# Patient Record
Sex: FEMALE | Race: WHITE | Employment: UNEMPLOYED | URBAN - METROPOLITAN AREA
[De-identification: names, ages, dates, MRNs, and addresses within clinical notes are randomized per-mention and may not be internally consistent; named-entity substitution may affect disease eponyms.]

---

## 2020-01-01 ENCOUNTER — APPOINTMENT (OUTPATIENT)
Dept: GENERAL RADIOLOGY | Age: 85
DRG: 480 | End: 2020-01-01
Payer: MEDICARE

## 2020-01-01 ENCOUNTER — APPOINTMENT (OUTPATIENT)
Dept: CT IMAGING | Age: 85
End: 2020-01-01
Payer: MEDICARE

## 2020-01-01 ENCOUNTER — APPOINTMENT (OUTPATIENT)
Dept: CT IMAGING | Age: 85
DRG: 480 | End: 2020-01-01
Payer: MEDICARE

## 2020-01-01 ENCOUNTER — HOSPITAL ENCOUNTER (INPATIENT)
Age: 85
LOS: 2 days | DRG: 480 | End: 2020-09-21
Attending: INTERNAL MEDICINE | Admitting: INTERNAL MEDICINE
Payer: MEDICARE

## 2020-01-01 ENCOUNTER — ANESTHESIA (OUTPATIENT)
Dept: OPERATING ROOM | Age: 85
DRG: 480 | End: 2020-01-01
Payer: MEDICARE

## 2020-01-01 ENCOUNTER — HOSPITAL ENCOUNTER (EMERGENCY)
Age: 85
Discharge: HOME | End: 2020-08-31
Payer: MEDICARE

## 2020-01-01 ENCOUNTER — APPOINTMENT (OUTPATIENT)
Dept: ULTRASOUND IMAGING | Age: 85
DRG: 480 | End: 2020-01-01
Payer: MEDICARE

## 2020-01-01 ENCOUNTER — ANESTHESIA EVENT (OUTPATIENT)
Dept: OPERATING ROOM | Age: 85
DRG: 480 | End: 2020-01-01
Payer: MEDICARE

## 2020-01-01 ENCOUNTER — APPOINTMENT (OUTPATIENT)
Dept: MRI IMAGING | Age: 85
DRG: 480 | End: 2020-01-01
Payer: MEDICARE

## 2020-01-01 VITALS
SYSTOLIC BLOOD PRESSURE: 105 MMHG | RESPIRATION RATE: 19 BRPM | TEMPERATURE: 96.8 F | OXYGEN SATURATION: 78 % | DIASTOLIC BLOOD PRESSURE: 53 MMHG

## 2020-01-01 VITALS
WEIGHT: 115 LBS | OXYGEN SATURATION: 95 % | HEART RATE: 66 BPM | SYSTOLIC BLOOD PRESSURE: 161 MMHG | TEMPERATURE: 98 F | BODY MASS INDEX: 22.58 KG/M2 | DIASTOLIC BLOOD PRESSURE: 91 MMHG | HEIGHT: 60 IN | RESPIRATION RATE: 16 BRPM

## 2020-01-01 VITALS
WEIGHT: 115 LBS | HEART RATE: 22 BPM | DIASTOLIC BLOOD PRESSURE: 14 MMHG | TEMPERATURE: 97.4 F | OXYGEN SATURATION: 71 % | RESPIRATION RATE: 24 BRPM | HEIGHT: 62 IN | SYSTOLIC BLOOD PRESSURE: 111 MMHG | BODY MASS INDEX: 21.16 KG/M2

## 2020-01-01 LAB
ABO/RH: NORMAL
ALBUMIN SERPL-MCNC: 3.2 G/DL (ref 3.5–4.6)
ALBUMIN SERPL-MCNC: 3.3 G/DL (ref 3.5–4.6)
ALBUMIN SERPL-MCNC: 3.6 G/DL (ref 3.5–4.6)
ALBUMIN SERPL-MCNC: 3.9 G/DL (ref 3.5–4.6)
ALBUMIN SERPL-MCNC: 4.5 G/DL (ref 3.5–4.6)
ALP BLD-CCNC: 77 U/L (ref 40–130)
ALP BLD-CCNC: 78 U/L (ref 40–130)
ALP BLD-CCNC: 92 U/L (ref 40–130)
ALT SERPL-CCNC: 13 U/L (ref 0–33)
ALT SERPL-CCNC: 15 U/L (ref 0–33)
ALT SERPL-CCNC: 15 U/L (ref 0–33)
ANION GAP SERPL CALCULATED.3IONS-SCNC: 12 MEQ/L (ref 9–15)
ANION GAP SERPL CALCULATED.3IONS-SCNC: 13 MEQ/L (ref 9–15)
ANION GAP SERPL CALCULATED.3IONS-SCNC: 14 MEQ/L (ref 9–15)
ANION GAP SERPL CALCULATED.3IONS-SCNC: 14 MEQ/L (ref 9–15)
ANION GAP SERPL CALCULATED.3IONS-SCNC: 15 MEQ/L (ref 9–15)
ANTIBODY SCREEN: NORMAL
APTT: 33.3 SEC (ref 24.4–36.8)
APTT: 33.6 SEC (ref 24.4–36.8)
AST SERPL-CCNC: 20 U/L (ref 0–35)
AST SERPL-CCNC: 21 U/L (ref 0–35)
AST SERPL-CCNC: 23 U/L (ref 0–35)
BASE EXCESS ARTERIAL: -6 (ref -3–3)
BASOPHILS ABSOLUTE: 0 K/UL (ref 0–0.2)
BASOPHILS ABSOLUTE: 0.1 K/UL (ref 0–0.2)
BASOPHILS RELATIVE PERCENT: 0.2 %
BASOPHILS RELATIVE PERCENT: 0.7 %
BASOPHILS RELATIVE PERCENT: 0.8 %
BILIRUB SERPL-MCNC: 0.6 MG/DL (ref 0.2–0.7)
BILIRUB SERPL-MCNC: 0.7 MG/DL (ref 0.2–0.7)
BILIRUB SERPL-MCNC: 0.7 MG/DL (ref 0.2–0.7)
BUN BLDV-MCNC: 17 MG/DL (ref 8–23)
BUN BLDV-MCNC: 20 MG/DL (ref 8–23)
BUN BLDV-MCNC: 22 MG/DL (ref 8–23)
BUN BLDV-MCNC: 28 MG/DL (ref 8–23)
BUN BLDV-MCNC: 29 MG/DL (ref 8–23)
C-REACTIVE PROTEIN, HIGH SENSITIVITY: 30 MG/L (ref 0–5)
C-REACTIVE PROTEIN, HIGH SENSITIVITY: 35.4 MG/L (ref 0–5)
CALCIUM IONIZED: 1.22 MMOL/L (ref 1.12–1.32)
CALCIUM SERPL-MCNC: 10.2 MG/DL (ref 8.5–9.9)
CALCIUM SERPL-MCNC: 9 MG/DL (ref 8.5–9.9)
CALCIUM SERPL-MCNC: 9.1 MG/DL (ref 8.5–9.9)
CALCIUM SERPL-MCNC: 9.2 MG/DL (ref 8.5–9.9)
CALCIUM SERPL-MCNC: 9.6 MG/DL (ref 8.5–9.9)
CHLORIDE BLD-SCNC: 102 MEQ/L (ref 95–107)
CHLORIDE BLD-SCNC: 104 MEQ/L (ref 95–107)
CHLORIDE BLD-SCNC: 105 MEQ/L (ref 95–107)
CHLORIDE BLD-SCNC: 106 MEQ/L (ref 95–107)
CHLORIDE BLD-SCNC: 109 MEQ/L (ref 95–107)
CO2: 14 MEQ/L (ref 20–31)
CO2: 16 MEQ/L (ref 20–31)
CO2: 19 MEQ/L (ref 20–31)
CREAT SERPL-MCNC: 0.69 MG/DL (ref 0.5–0.9)
CREAT SERPL-MCNC: 0.72 MG/DL (ref 0.5–0.9)
CREAT SERPL-MCNC: 0.89 MG/DL (ref 0.5–0.9)
CREAT SERPL-MCNC: 0.92 MG/DL (ref 0.5–0.9)
CREAT SERPL-MCNC: 0.97 MG/DL (ref 0.5–0.9)
EKG ATRIAL RATE: 68 BPM
EKG ATRIAL RATE: 70 BPM
EKG P AXIS: 54 DEGREES
EKG P AXIS: 70 DEGREES
EKG P-R INTERVAL: 106 MS
EKG P-R INTERVAL: 96 MS
EKG Q-T INTERVAL: 454 MS
EKG Q-T INTERVAL: 456 MS
EKG QRS DURATION: 126 MS
EKG QRS DURATION: 126 MS
EKG QTC CALCULATION (BAZETT): 484 MS
EKG QTC CALCULATION (BAZETT): 490 MS
EKG R AXIS: -77 DEGREES
EKG R AXIS: -81 DEGREES
EKG T AXIS: 23 DEGREES
EKG T AXIS: 7 DEGREES
EKG VENTRICULAR RATE: 68 BPM
EKG VENTRICULAR RATE: 70 BPM
EOSINOPHILS ABSOLUTE: 0 K/UL (ref 0–0.7)
EOSINOPHILS ABSOLUTE: 0.1 K/UL (ref 0–0.7)
EOSINOPHILS ABSOLUTE: 0.1 K/UL (ref 0–0.7)
EOSINOPHILS ABSOLUTE: 0.2 K/UL (ref 0–0.7)
EOSINOPHILS ABSOLUTE: 0.2 K/UL (ref 0–0.7)
EOSINOPHILS RELATIVE PERCENT: 0.1 %
EOSINOPHILS RELATIVE PERCENT: 1 %
EOSINOPHILS RELATIVE PERCENT: 1 %
EOSINOPHILS RELATIVE PERCENT: 1.8 %
EOSINOPHILS RELATIVE PERCENT: 2.3 %
GFR AFRICAN AMERICAN: >60
GFR NON-AFRICAN AMERICAN: 51
GFR NON-AFRICAN AMERICAN: 53
GFR NON-AFRICAN AMERICAN: 56.3
GFR NON-AFRICAN AMERICAN: 58.5
GFR NON-AFRICAN AMERICAN: >60
GFR NON-AFRICAN AMERICAN: >60
GLOBULIN: 2.4 G/DL (ref 2.3–3.5)
GLOBULIN: 2.8 G/DL (ref 2.3–3.5)
GLOBULIN: 3.3 G/DL (ref 2.3–3.5)
GLUCOSE BLD-MCNC: 101 MG/DL (ref 70–99)
GLUCOSE BLD-MCNC: 103 MG/DL (ref 70–99)
GLUCOSE BLD-MCNC: 107 MG/DL (ref 70–99)
GLUCOSE BLD-MCNC: 126 MG/DL (ref 60–115)
GLUCOSE BLD-MCNC: 90 MG/DL (ref 70–99)
GLUCOSE BLD-MCNC: 91 MG/DL (ref 70–99)
HCO3 ARTERIAL: 18.6 MMOL/L (ref 21–29)
HCT VFR BLD CALC: 41.9 % (ref 37–47)
HCT VFR BLD CALC: 43.4 % (ref 37–47)
HCT VFR BLD CALC: 44.3 % (ref 37–47)
HCT VFR BLD CALC: 46.5 % (ref 37–47)
HCT VFR BLD CALC: 50.2 % (ref 37–47)
HEMOGLOBIN: 14 G/DL (ref 12–16)
HEMOGLOBIN: 14.5 G/DL (ref 12–16)
HEMOGLOBIN: 14.8 G/DL (ref 12–16)
HEMOGLOBIN: 15.4 G/DL (ref 12–16)
HEMOGLOBIN: 15.4 GM/DL (ref 12–16)
HEMOGLOBIN: 16.8 G/DL (ref 12–16)
INR BLD: 0.9
INR BLD: 1
LACTATE: 1.03 MMOL/L (ref 0.4–2)
LACTIC ACID: 1.4 MMOL/L (ref 0.5–2.2)
LACTIC ACID: 2.6 MMOL/L (ref 0.5–2.2)
LV EF: 48 %
LVEF MODALITY: NORMAL
LYMPHOCYTES ABSOLUTE: 0.8 K/UL (ref 1–4.8)
LYMPHOCYTES ABSOLUTE: 1 K/UL (ref 1–4.8)
LYMPHOCYTES ABSOLUTE: 1.1 K/UL (ref 1–4.8)
LYMPHOCYTES ABSOLUTE: 1.2 K/UL (ref 1–4.8)
LYMPHOCYTES ABSOLUTE: 1.6 K/UL (ref 1–4.8)
LYMPHOCYTES RELATIVE PERCENT: 10.1 %
LYMPHOCYTES RELATIVE PERCENT: 12.1 %
LYMPHOCYTES RELATIVE PERCENT: 21.1 %
LYMPHOCYTES RELATIVE PERCENT: 5.1 %
LYMPHOCYTES RELATIVE PERCENT: 8.9 %
MAGNESIUM: 2 MG/DL (ref 1.7–2.4)
MAGNESIUM: 2.2 MG/DL (ref 1.7–2.4)
MCH RBC QN AUTO: 30.5 PG (ref 27–31.3)
MCH RBC QN AUTO: 30.5 PG (ref 27–31.3)
MCH RBC QN AUTO: 30.6 PG (ref 27–31.3)
MCH RBC QN AUTO: 30.8 PG (ref 27–31.3)
MCH RBC QN AUTO: 31.1 PG (ref 27–31.3)
MCHC RBC AUTO-ENTMCNC: 33.1 % (ref 33–37)
MCHC RBC AUTO-ENTMCNC: 33.3 % (ref 33–37)
MCHC RBC AUTO-ENTMCNC: 33.3 % (ref 33–37)
MCHC RBC AUTO-ENTMCNC: 33.5 % (ref 33–37)
MCHC RBC AUTO-ENTMCNC: 33.5 % (ref 33–37)
MCV RBC AUTO: 91.3 FL (ref 82–100)
MCV RBC AUTO: 91.6 FL (ref 82–100)
MCV RBC AUTO: 91.9 FL (ref 82–100)
MCV RBC AUTO: 92.3 FL (ref 82–100)
MCV RBC AUTO: 93.3 FL (ref 82–100)
MONOCYTES ABSOLUTE: 0.7 K/UL (ref 0.2–0.8)
MONOCYTES ABSOLUTE: 1.2 K/UL (ref 0.2–0.8)
MONOCYTES ABSOLUTE: 1.7 K/UL (ref 0.2–0.8)
MONOCYTES RELATIVE PERCENT: 10.6 %
MONOCYTES RELATIVE PERCENT: 11.4 %
MONOCYTES RELATIVE PERCENT: 11.8 %
MONOCYTES RELATIVE PERCENT: 9.7 %
MONOCYTES RELATIVE PERCENT: 9.9 %
NEUTROPHILS ABSOLUTE: 13.3 K/UL (ref 1.4–6.5)
NEUTROPHILS ABSOLUTE: 5.1 K/UL (ref 1.4–6.5)
NEUTROPHILS ABSOLUTE: 7.3 K/UL (ref 1.4–6.5)
NEUTROPHILS ABSOLUTE: 8.3 K/UL (ref 1.4–6.5)
NEUTROPHILS ABSOLUTE: 9.3 K/UL (ref 1.4–6.5)
NEUTROPHILS RELATIVE PERCENT: 67.4 %
NEUTROPHILS RELATIVE PERCENT: 73 %
NEUTROPHILS RELATIVE PERCENT: 76 %
NEUTROPHILS RELATIVE PERCENT: 79.4 %
NEUTROPHILS RELATIVE PERCENT: 84 %
O2 SAT, ARTERIAL: 91 % (ref 93–100)
PCO2 ARTERIAL: 29 MM HG (ref 35–45)
PDW BLD-RTO: 12.8 % (ref 11.5–14.5)
PDW BLD-RTO: 13.1 % (ref 11.5–14.5)
PDW BLD-RTO: 13.1 % (ref 11.5–14.5)
PDW BLD-RTO: 13.2 % (ref 11.5–14.5)
PDW BLD-RTO: 13.3 % (ref 11.5–14.5)
PERFORMED ON: ABNORMAL
PH ARTERIAL: 7.41 (ref 7.35–7.45)
PHOSPHORUS: 2.9 MG/DL (ref 2.3–4.8)
PHOSPHORUS: 3.1 MG/DL (ref 2.3–4.8)
PLATELET # BLD: 133 K/UL (ref 130–400)
PLATELET # BLD: 140 K/UL (ref 130–400)
PLATELET # BLD: 156 K/UL (ref 130–400)
PLATELET # BLD: 186 K/UL (ref 130–400)
PLATELET # BLD: 227 K/UL (ref 130–400)
PLATELET SLIDE REVIEW: ADEQUATE
PO2 ARTERIAL: 59 MM HG (ref 75–108)
POC CHLORIDE: 109 MEQ/L (ref 99–110)
POC CREATININE: 1 MG/DL (ref 0.6–1.2)
POC FIO2: 28
POC HEMATOCRIT: 45 % (ref 36–48)
POC POTASSIUM: 4 MEQ/L (ref 3.5–5.1)
POC SAMPLE TYPE: ABNORMAL
POC SODIUM: 138 MEQ/L (ref 136–145)
POTASSIUM REFLEX MAGNESIUM: 4.1 MEQ/L (ref 3.4–4.9)
POTASSIUM SERPL-SCNC: 4 MEQ/L (ref 3.4–4.9)
POTASSIUM SERPL-SCNC: 4.1 MEQ/L (ref 3.4–4.9)
POTASSIUM SERPL-SCNC: 4.3 MEQ/L (ref 3.4–4.9)
POTASSIUM SERPL-SCNC: 4.8 MEQ/L (ref 3.4–4.9)
PRO-BNP: 1370 PG/ML
PRO-BNP: 2064 PG/ML
PROCALCITONIN: 0.09 NG/ML (ref 0–0.15)
PROCALCITONIN: 0.18 NG/ML (ref 0–0.15)
PROTHROMBIN TIME: 12.5 SEC (ref 12.3–14.9)
PROTHROMBIN TIME: 13.5 SEC (ref 12.3–14.9)
RBC # BLD: 4.59 M/UL (ref 4.2–5.4)
RBC # BLD: 4.65 M/UL (ref 4.2–5.4)
RBC # BLD: 4.84 M/UL (ref 4.2–5.4)
RBC # BLD: 5.04 M/UL (ref 4.2–5.4)
RBC # BLD: 5.47 M/UL (ref 4.2–5.4)
RBC # BLD: NORMAL 10*6/UL
SARS-COV-2, NAAT: NOT DETECTED
SODIUM BLD-SCNC: 133 MEQ/L (ref 135–144)
SODIUM BLD-SCNC: 134 MEQ/L (ref 135–144)
SODIUM BLD-SCNC: 136 MEQ/L (ref 135–144)
SODIUM BLD-SCNC: 137 MEQ/L (ref 135–144)
SODIUM BLD-SCNC: 141 MEQ/L (ref 135–144)
TCO2 ARTERIAL: 20 (ref 22–29)
TOTAL PROTEIN: 6.3 G/DL (ref 6.3–8)
TOTAL PROTEIN: 6.4 G/DL (ref 6.3–8)
TOTAL PROTEIN: 7.8 G/DL (ref 6.3–8)
WBC # BLD: 10 K/UL (ref 4.8–10.8)
WBC # BLD: 10.9 K/UL (ref 4.8–10.8)
WBC # BLD: 11.7 K/UL (ref 4.8–10.8)
WBC # BLD: 15.8 K/UL (ref 4.8–10.8)
WBC # BLD: 7.6 K/UL (ref 4.8–10.8)

## 2020-01-01 PROCEDURE — 2580000003 HC RX 258: Performed by: NURSE PRACTITIONER

## 2020-01-01 PROCEDURE — 85025 COMPLETE CBC W/AUTO DIFF WBC: CPT

## 2020-01-01 PROCEDURE — 83880 ASSAY OF NATRIURETIC PEPTIDE: CPT

## 2020-01-01 PROCEDURE — 2500000003 HC RX 250 WO HCPCS: Performed by: STUDENT IN AN ORGANIZED HEALTH CARE EDUCATION/TRAINING PROGRAM

## 2020-01-01 PROCEDURE — 6360000002 HC RX W HCPCS: Performed by: NURSE PRACTITIONER

## 2020-01-01 PROCEDURE — 6360000002 HC RX W HCPCS: Performed by: INTERNAL MEDICINE

## 2020-01-01 PROCEDURE — 3700000001 HC ADD 15 MINUTES (ANESTHESIA): Performed by: ORTHOPAEDIC SURGERY

## 2020-01-01 PROCEDURE — 2500000003 HC RX 250 WO HCPCS: Performed by: INTERNAL MEDICINE

## 2020-01-01 PROCEDURE — 80069 RENAL FUNCTION PANEL: CPT

## 2020-01-01 PROCEDURE — 73552 X-RAY EXAM OF FEMUR 2/>: CPT

## 2020-01-01 PROCEDURE — 72170 X-RAY EXAM OF PELVIS: CPT

## 2020-01-01 PROCEDURE — 86141 C-REACTIVE PROTEIN HS: CPT

## 2020-01-01 PROCEDURE — 6830039000 HC L3 TRAUMA ALERT

## 2020-01-01 PROCEDURE — 6370000000 HC RX 637 (ALT 250 FOR IP): Performed by: PHYSICIAN ASSISTANT

## 2020-01-01 PROCEDURE — 99285 EMERGENCY DEPT VISIT HI MDM: CPT

## 2020-01-01 PROCEDURE — 2580000003 HC RX 258: Performed by: INTERNAL MEDICINE

## 2020-01-01 PROCEDURE — U0002 COVID-19 LAB TEST NON-CDC: HCPCS

## 2020-01-01 PROCEDURE — 99284 EMERGENCY DEPT VISIT MOD MDM: CPT

## 2020-01-01 PROCEDURE — 36415 COLL VENOUS BLD VENIPUNCTURE: CPT

## 2020-01-01 PROCEDURE — 6360000002 HC RX W HCPCS: Performed by: PHYSICIAN ASSISTANT

## 2020-01-01 PROCEDURE — 99222 1ST HOSP IP/OBS MODERATE 55: CPT | Performed by: INTERNAL MEDICINE

## 2020-01-01 PROCEDURE — 7100000000 HC PACU RECOVERY - FIRST 15 MIN: Performed by: ORTHOPAEDIC SURGERY

## 2020-01-01 PROCEDURE — 80053 COMPREHEN METABOLIC PANEL: CPT

## 2020-01-01 PROCEDURE — 85610 PROTHROMBIN TIME: CPT

## 2020-01-01 PROCEDURE — 6360000004 HC RX CONTRAST MEDICATION: Performed by: PHYSICIAN ASSISTANT

## 2020-01-01 PROCEDURE — 96365 THER/PROPH/DIAG IV INF INIT: CPT

## 2020-01-01 PROCEDURE — 82803 BLOOD GASES ANY COMBINATION: CPT

## 2020-01-01 PROCEDURE — 72125 CT NECK SPINE W/O DYE: CPT

## 2020-01-01 PROCEDURE — 3600000004 HC SURGERY LEVEL 4 BASE: Performed by: ORTHOPAEDIC SURGERY

## 2020-01-01 PROCEDURE — 72131 CT LUMBAR SPINE W/O DYE: CPT

## 2020-01-01 PROCEDURE — 82435 ASSAY OF BLOOD CHLORIDE: CPT

## 2020-01-01 PROCEDURE — 6370000000 HC RX 637 (ALT 250 FOR IP): Performed by: INTERNAL MEDICINE

## 2020-01-01 PROCEDURE — 85014 HEMATOCRIT: CPT

## 2020-01-01 PROCEDURE — C1769 GUIDE WIRE: HCPCS | Performed by: ORTHOPAEDIC SURGERY

## 2020-01-01 PROCEDURE — 83605 ASSAY OF LACTIC ACID: CPT

## 2020-01-01 PROCEDURE — 2709999900 HC NON-CHARGEABLE SUPPLY: Performed by: ORTHOPAEDIC SURGERY

## 2020-01-01 PROCEDURE — 70450 CT HEAD/BRAIN W/O DYE: CPT

## 2020-01-01 PROCEDURE — 84132 ASSAY OF SERUM POTASSIUM: CPT

## 2020-01-01 PROCEDURE — 2580000003 HC RX 258: Performed by: PHYSICIAN ASSISTANT

## 2020-01-01 PROCEDURE — 3600000014 HC SURGERY LEVEL 4 ADDTL 15MIN: Performed by: ORTHOPAEDIC SURGERY

## 2020-01-01 PROCEDURE — 0QS634Z REPOSITION RIGHT UPPER FEMUR WITH INTERNAL FIXATION DEVICE, PERCUTANEOUS APPROACH: ICD-10-PCS | Performed by: ORTHOPAEDIC SURGERY

## 2020-01-01 PROCEDURE — 3209999900 FLUORO FOR SURGICAL PROCEDURES

## 2020-01-01 PROCEDURE — 73700 CT LOWER EXTREMITY W/O DYE: CPT

## 2020-01-01 PROCEDURE — 2700000000 HC OXYGEN THERAPY PER DAY

## 2020-01-01 PROCEDURE — 6360000002 HC RX W HCPCS: Performed by: FAMILY MEDICINE

## 2020-01-01 PROCEDURE — 83735 ASSAY OF MAGNESIUM: CPT

## 2020-01-01 PROCEDURE — 71275 CT ANGIOGRAPHY CHEST: CPT

## 2020-01-01 PROCEDURE — 86901 BLOOD TYPING SEROLOGIC RH(D): CPT

## 2020-01-01 PROCEDURE — 36600 WITHDRAWAL OF ARTERIAL BLOOD: CPT

## 2020-01-01 PROCEDURE — C1713 ANCHOR/SCREW BN/BN,TIS/BN: HCPCS | Performed by: ORTHOPAEDIC SURGERY

## 2020-01-01 PROCEDURE — 27245 TREAT THIGH FRACTURE: CPT | Performed by: ORTHOPAEDIC SURGERY

## 2020-01-01 PROCEDURE — 6370000000 HC RX 637 (ALT 250 FOR IP): Performed by: NURSE PRACTITIONER

## 2020-01-01 PROCEDURE — 84145 PROCALCITONIN (PCT): CPT

## 2020-01-01 PROCEDURE — 2580000003 HC RX 258: Performed by: STUDENT IN AN ORGANIZED HEALTH CARE EDUCATION/TRAINING PROGRAM

## 2020-01-01 PROCEDURE — 2780000010 HC IMPLANT OTHER: Performed by: ORTHOPAEDIC SURGERY

## 2020-01-01 PROCEDURE — 1210000000 HC MED SURG R&B

## 2020-01-01 PROCEDURE — 6370000000 HC RX 637 (ALT 250 FOR IP): Performed by: ORTHOPAEDIC SURGERY

## 2020-01-01 PROCEDURE — 92950 HEART/LUNG RESUSCITATION CPR: CPT

## 2020-01-01 PROCEDURE — 93005 ELECTROCARDIOGRAM TRACING: CPT | Performed by: STUDENT IN AN ORGANIZED HEALTH CARE EDUCATION/TRAINING PROGRAM

## 2020-01-01 PROCEDURE — 6360000002 HC RX W HCPCS: Performed by: STUDENT IN AN ORGANIZED HEALTH CARE EDUCATION/TRAINING PROGRAM

## 2020-01-01 PROCEDURE — 93306 TTE W/DOPPLER COMPLETE: CPT

## 2020-01-01 PROCEDURE — 84295 ASSAY OF SERUM SODIUM: CPT

## 2020-01-01 PROCEDURE — 2500000003 HC RX 250 WO HCPCS: Performed by: ORTHOPAEDIC SURGERY

## 2020-01-01 PROCEDURE — 73721 MRI JNT OF LWR EXTRE W/O DYE: CPT

## 2020-01-01 PROCEDURE — 93971 EXTREMITY STUDY: CPT

## 2020-01-01 PROCEDURE — 2580000003 HC RX 258: Performed by: ORTHOPAEDIC SURGERY

## 2020-01-01 PROCEDURE — 85730 THROMBOPLASTIN TIME PARTIAL: CPT

## 2020-01-01 PROCEDURE — 71045 X-RAY EXAM CHEST 1 VIEW: CPT

## 2020-01-01 PROCEDURE — 93010 ELECTROCARDIOGRAM REPORT: CPT | Performed by: INTERNAL MEDICINE

## 2020-01-01 PROCEDURE — 93005 ELECTROCARDIOGRAM TRACING: CPT

## 2020-01-01 PROCEDURE — 86850 RBC ANTIBODY SCREEN: CPT

## 2020-01-01 PROCEDURE — 96375 TX/PRO/DX INJ NEW DRUG ADDON: CPT

## 2020-01-01 PROCEDURE — 82330 ASSAY OF CALCIUM: CPT

## 2020-01-01 PROCEDURE — 86900 BLOOD TYPING SEROLOGIC ABO: CPT

## 2020-01-01 PROCEDURE — 76942 ECHO GUIDE FOR BIOPSY: CPT | Performed by: STUDENT IN AN ORGANIZED HEALTH CARE EDUCATION/TRAINING PROGRAM

## 2020-01-01 PROCEDURE — 2720000010 HC SURG SUPPLY STERILE: Performed by: ORTHOPAEDIC SURGERY

## 2020-01-01 PROCEDURE — 7100000001 HC PACU RECOVERY - ADDTL 15 MIN: Performed by: ORTHOPAEDIC SURGERY

## 2020-01-01 PROCEDURE — 82565 ASSAY OF CREATININE: CPT

## 2020-01-01 PROCEDURE — 3700000000 HC ANESTHESIA ATTENDED CARE: Performed by: ORTHOPAEDIC SURGERY

## 2020-01-01 PROCEDURE — 99231 SBSQ HOSP IP/OBS SF/LOW 25: CPT | Performed by: ORTHOPAEDIC SURGERY

## 2020-01-01 DEVICE — SCREW BNE L34MM DIA5MM TIB LT GRN TI ST CANN LOK FULL THRD: Type: IMPLANTABLE DEVICE | Site: HIP | Status: FUNCTIONAL

## 2020-01-01 DEVICE — IMPLANTABLE DEVICE: Type: IMPLANTABLE DEVICE | Site: HIP | Status: FUNCTIONAL

## 2020-01-01 RX ORDER — ATENOLOL 50 MG/1
100 TABLET ORAL DAILY
COMMUNITY
Start: 2019-09-20

## 2020-01-01 RX ORDER — SODIUM CHLORIDE 9 MG/ML
INJECTION, SOLUTION INTRAVENOUS CONTINUOUS PRN
Status: DISCONTINUED | OUTPATIENT
Start: 2020-01-01 | End: 2020-01-01 | Stop reason: SDUPTHER

## 2020-01-01 RX ORDER — HYDROCODONE BITARTRATE AND ACETAMINOPHEN 5; 325 MG/1; MG/1
1 TABLET ORAL PRN
Status: DISCONTINUED | OUTPATIENT
Start: 2020-01-01 | End: 2020-01-01

## 2020-01-01 RX ORDER — ACETAMINOPHEN 325 MG/1
650 TABLET ORAL EVERY 4 HOURS PRN
Status: DISCONTINUED | OUTPATIENT
Start: 2020-01-01 | End: 2020-01-01 | Stop reason: ALTCHOICE

## 2020-01-01 RX ORDER — CEFAZOLIN SODIUM 1 G/3ML
INJECTION, POWDER, FOR SOLUTION INTRAMUSCULAR; INTRAVENOUS PRN
Status: DISCONTINUED | OUTPATIENT
Start: 2020-01-01 | End: 2020-01-01 | Stop reason: SDUPTHER

## 2020-01-01 RX ORDER — LABETALOL HYDROCHLORIDE 5 MG/ML
10 INJECTION, SOLUTION INTRAVENOUS EVERY 4 HOURS PRN
Status: DISCONTINUED | OUTPATIENT
Start: 2020-01-01 | End: 2020-09-22 | Stop reason: HOSPADM

## 2020-01-01 RX ORDER — 0.9 % SODIUM CHLORIDE 0.9 %
1000 INTRAVENOUS SOLUTION INTRAVENOUS ONCE
Status: COMPLETED | OUTPATIENT
Start: 2020-01-01 | End: 2020-01-01

## 2020-01-01 RX ORDER — LIDOCAINE HYDROCHLORIDE 10 MG/ML
1 INJECTION, SOLUTION EPIDURAL; INFILTRATION; INTRACAUDAL; PERINEURAL
Status: DISCONTINUED | OUTPATIENT
Start: 2020-01-01 | End: 2020-01-01 | Stop reason: HOSPADM

## 2020-01-01 RX ORDER — CEFAZOLIN SODIUM 2 G/50ML
2 SOLUTION INTRAVENOUS
Status: SHIPPED | OUTPATIENT
Start: 2020-01-01 | End: 2020-01-01

## 2020-01-01 RX ORDER — HYDROCODONE BITARTRATE AND ACETAMINOPHEN 5; 325 MG/1; MG/1
2 TABLET ORAL PRN
Status: DISCONTINUED | OUTPATIENT
Start: 2020-01-01 | End: 2020-01-01

## 2020-01-01 RX ORDER — SODIUM CHLORIDE, SODIUM LACTATE, POTASSIUM CHLORIDE, CALCIUM CHLORIDE 600; 310; 30; 20 MG/100ML; MG/100ML; MG/100ML; MG/100ML
INJECTION, SOLUTION INTRAVENOUS CONTINUOUS
Status: DISCONTINUED | OUTPATIENT
Start: 2020-01-01 | End: 2020-09-22 | Stop reason: HOSPADM

## 2020-01-01 RX ORDER — METOCLOPRAMIDE HYDROCHLORIDE 5 MG/ML
10 INJECTION INTRAMUSCULAR; INTRAVENOUS
Status: DISCONTINUED | OUTPATIENT
Start: 2020-01-01 | End: 2020-01-01

## 2020-01-01 RX ORDER — SODIUM CHLORIDE 9 MG/ML
INJECTION, SOLUTION INTRAVENOUS
Status: COMPLETED
Start: 2020-01-01 | End: 2020-01-01

## 2020-01-01 RX ORDER — KETOROLAC TROMETHAMINE 15 MG/ML
7.5 INJECTION, SOLUTION INTRAMUSCULAR; INTRAVENOUS EVERY 6 HOURS PRN
Status: DISCONTINUED | OUTPATIENT
Start: 2020-01-01 | End: 2020-09-22 | Stop reason: HOSPADM

## 2020-01-01 RX ORDER — ACETAMINOPHEN 650 MG/1
650 SUPPOSITORY RECTAL EVERY 6 HOURS PRN
Status: DISCONTINUED | OUTPATIENT
Start: 2020-01-01 | End: 2020-09-22 | Stop reason: HOSPADM

## 2020-01-01 RX ORDER — ONDANSETRON 2 MG/ML
4 INJECTION INTRAMUSCULAR; INTRAVENOUS EVERY 6 HOURS PRN
Status: DISCONTINUED | OUTPATIENT
Start: 2020-01-01 | End: 2020-09-22 | Stop reason: HOSPADM

## 2020-01-01 RX ORDER — LISINOPRIL 10 MG/1
10 TABLET ORAL DAILY
Status: DISCONTINUED | OUTPATIENT
Start: 2020-01-01 | End: 2020-01-01

## 2020-01-01 RX ORDER — MEPERIDINE HYDROCHLORIDE 25 MG/ML
12.5 INJECTION INTRAMUSCULAR; INTRAVENOUS; SUBCUTANEOUS EVERY 5 MIN PRN
Status: DISCONTINUED | OUTPATIENT
Start: 2020-01-01 | End: 2020-01-01

## 2020-01-01 RX ORDER — ALBUTEROL SULFATE 2.5 MG/3ML
2.5 SOLUTION RESPIRATORY (INHALATION) EVERY 4 HOURS PRN
Status: DISCONTINUED | OUTPATIENT
Start: 2020-01-01 | End: 2020-09-22 | Stop reason: HOSPADM

## 2020-01-01 RX ORDER — SODIUM CHLORIDE 0.9 % (FLUSH) 0.9 %
10 SYRINGE (ML) INJECTION PRN
Status: DISCONTINUED | OUTPATIENT
Start: 2020-01-01 | End: 2020-01-01 | Stop reason: HOSPADM

## 2020-01-01 RX ORDER — SODIUM CHLORIDE 0.9 % (FLUSH) 0.9 %
10 SYRINGE (ML) INJECTION PRN
Status: DISCONTINUED | OUTPATIENT
Start: 2020-01-01 | End: 2020-09-22 | Stop reason: HOSPADM

## 2020-01-01 RX ORDER — SODIUM CHLORIDE 0.9 % (FLUSH) 0.9 %
10 SYRINGE (ML) INJECTION EVERY 12 HOURS SCHEDULED
Status: DISCONTINUED | OUTPATIENT
Start: 2020-01-01 | End: 2020-09-22 | Stop reason: HOSPADM

## 2020-01-01 RX ORDER — KETOROLAC TROMETHAMINE 15 MG/ML
15 INJECTION, SOLUTION INTRAMUSCULAR; INTRAVENOUS EVERY 6 HOURS PRN
Status: DISCONTINUED | OUTPATIENT
Start: 2020-01-01 | End: 2020-01-01

## 2020-01-01 RX ORDER — POLYETHYLENE GLYCOL 3350 17 G/17G
17 POWDER, FOR SOLUTION ORAL DAILY PRN
Status: DISCONTINUED | OUTPATIENT
Start: 2020-01-01 | End: 2020-09-22 | Stop reason: HOSPADM

## 2020-01-01 RX ORDER — DIPHENHYDRAMINE HYDROCHLORIDE 50 MG/ML
12.5 INJECTION INTRAMUSCULAR; INTRAVENOUS
Status: DISCONTINUED | OUTPATIENT
Start: 2020-01-01 | End: 2020-01-01

## 2020-01-01 RX ORDER — ONDANSETRON 2 MG/ML
4 INJECTION INTRAMUSCULAR; INTRAVENOUS
Status: DISCONTINUED | OUTPATIENT
Start: 2020-01-01 | End: 2020-01-01

## 2020-01-01 RX ORDER — LISINOPRIL 20 MG/1
20 TABLET ORAL DAILY
Status: DISCONTINUED | OUTPATIENT
Start: 2020-01-01 | End: 2020-09-22 | Stop reason: HOSPADM

## 2020-01-01 RX ORDER — ASPIRIN 81 MG/1
81 TABLET ORAL DAILY
Status: DISCONTINUED | OUTPATIENT
Start: 2020-01-01 | End: 2020-09-22 | Stop reason: HOSPADM

## 2020-01-01 RX ORDER — HYDRALAZINE HYDROCHLORIDE 20 MG/ML
10 INJECTION INTRAMUSCULAR; INTRAVENOUS EVERY 10 MIN PRN
Status: DISCONTINUED | OUTPATIENT
Start: 2020-01-01 | End: 2020-09-22 | Stop reason: HOSPADM

## 2020-01-01 RX ORDER — PROPOFOL 10 MG/ML
INJECTION, EMULSION INTRAVENOUS CONTINUOUS PRN
Status: DISCONTINUED | OUTPATIENT
Start: 2020-01-01 | End: 2020-01-01 | Stop reason: SDUPTHER

## 2020-01-01 RX ORDER — LISINOPRIL 10 MG/1
10 TABLET ORAL DAILY
COMMUNITY

## 2020-01-01 RX ORDER — EPINEPHRINE 0.1 MG/ML
SYRINGE (ML) INJECTION DAILY PRN
Status: COMPLETED | OUTPATIENT
Start: 2020-01-01 | End: 2020-01-01

## 2020-01-01 RX ORDER — FENTANYL CITRATE 50 UG/ML
25 INJECTION, SOLUTION INTRAMUSCULAR; INTRAVENOUS ONCE
Status: COMPLETED | OUTPATIENT
Start: 2020-01-01 | End: 2020-01-01

## 2020-01-01 RX ORDER — 0.9 % SODIUM CHLORIDE 0.9 %
500 INTRAVENOUS SOLUTION INTRAVENOUS ONCE
Status: COMPLETED | OUTPATIENT
Start: 2020-01-01 | End: 2020-01-01

## 2020-01-01 RX ORDER — PROPOFOL 10 MG/ML
INJECTION, EMULSION INTRAVENOUS PRN
Status: DISCONTINUED | OUTPATIENT
Start: 2020-01-01 | End: 2020-01-01 | Stop reason: SDUPTHER

## 2020-01-01 RX ORDER — PROMETHAZINE HYDROCHLORIDE 12.5 MG/1
12.5 TABLET ORAL EVERY 6 HOURS PRN
Status: DISCONTINUED | OUTPATIENT
Start: 2020-01-01 | End: 2020-09-22 | Stop reason: HOSPADM

## 2020-01-01 RX ORDER — LORAZEPAM 2 MG/ML
INJECTION INTRAMUSCULAR DAILY PRN
Status: COMPLETED | OUTPATIENT
Start: 2020-01-01 | End: 2020-01-01

## 2020-01-01 RX ORDER — ROPIVACAINE HYDROCHLORIDE 5 MG/ML
INJECTION, SOLUTION EPIDURAL; INFILTRATION; PERINEURAL
Status: COMPLETED | OUTPATIENT
Start: 2020-01-01 | End: 2020-01-01

## 2020-01-01 RX ORDER — MAGNESIUM HYDROXIDE 1200 MG/15ML
LIQUID ORAL CONTINUOUS PRN
Status: COMPLETED | OUTPATIENT
Start: 2020-01-01 | End: 2020-01-01

## 2020-01-01 RX ORDER — ALBUTEROL SULFATE 90 UG/1
90 AEROSOL, METERED RESPIRATORY (INHALATION) NIGHTLY
COMMUNITY
Start: 2020-01-01

## 2020-01-01 RX ORDER — FENTANYL CITRATE 50 UG/ML
50 INJECTION, SOLUTION INTRAMUSCULAR; INTRAVENOUS EVERY 10 MIN PRN
Status: DISCONTINUED | OUTPATIENT
Start: 2020-01-01 | End: 2020-01-01

## 2020-01-01 RX ORDER — ACETAMINOPHEN 325 MG/1
650 TABLET ORAL EVERY 6 HOURS PRN
Status: DISCONTINUED | OUTPATIENT
Start: 2020-01-01 | End: 2020-09-22 | Stop reason: HOSPADM

## 2020-01-01 RX ORDER — ATENOLOL 50 MG/1
100 TABLET ORAL DAILY
Status: DISCONTINUED | OUTPATIENT
Start: 2020-01-01 | End: 2020-09-22 | Stop reason: HOSPADM

## 2020-01-01 RX ORDER — FAMOTIDINE 20 MG/1
10 TABLET, FILM COATED ORAL DAILY
Status: DISCONTINUED | OUTPATIENT
Start: 2020-01-01 | End: 2020-09-22 | Stop reason: HOSPADM

## 2020-01-01 RX ORDER — SODIUM CHLORIDE 9 MG/ML
INJECTION, SOLUTION INTRAVENOUS CONTINUOUS
Status: DISCONTINUED | OUTPATIENT
Start: 2020-01-01 | End: 2020-09-22 | Stop reason: HOSPADM

## 2020-01-01 RX ORDER — SODIUM CHLORIDE 0.9 % (FLUSH) 0.9 %
10 SYRINGE (ML) INJECTION EVERY 12 HOURS SCHEDULED
Status: DISCONTINUED | OUTPATIENT
Start: 2020-01-01 | End: 2020-01-01 | Stop reason: HOSPADM

## 2020-01-01 RX ORDER — BUPIVACAINE HYDROCHLORIDE 5 MG/ML
INJECTION, SOLUTION EPIDURAL; INTRACAUDAL PRN
Status: DISCONTINUED | OUTPATIENT
Start: 2020-01-01 | End: 2020-01-01 | Stop reason: SDUPTHER

## 2020-01-01 RX ADMIN — Medication 1 EACH: at 14:17

## 2020-01-01 RX ADMIN — SODIUM CHLORIDE 500 ML: 9 INJECTION, SOLUTION INTRAVENOUS at 16:23

## 2020-01-01 RX ADMIN — ATENOLOL 100 MG: 50 TABLET ORAL at 10:27

## 2020-01-01 RX ADMIN — CEFTRIAXONE SODIUM 1 G: 1 INJECTION, POWDER, FOR SOLUTION INTRAMUSCULAR; INTRAVENOUS at 23:45

## 2020-01-01 RX ADMIN — FAMOTIDINE 10 MG: 20 TABLET ORAL at 09:40

## 2020-01-01 RX ADMIN — PROPOFOL 30 MCG/KG/MIN: 10 INJECTION, EMULSION INTRAVENOUS at 09:40

## 2020-01-01 RX ADMIN — ENOXAPARIN SODIUM 30 MG: 30 INJECTION SUBCUTANEOUS at 10:27

## 2020-01-01 RX ADMIN — AZITHROMYCIN MONOHYDRATE 500 MG: 500 INJECTION, POWDER, LYOPHILIZED, FOR SOLUTION INTRAVENOUS at 03:30

## 2020-01-01 RX ADMIN — AZITHROMYCIN MONOHYDRATE 500 MG: 500 INJECTION, POWDER, LYOPHILIZED, FOR SOLUTION INTRAVENOUS at 02:05

## 2020-01-01 RX ADMIN — Medication 10 ML: at 03:30

## 2020-01-01 RX ADMIN — FENTANYL CITRATE 25 MCG: 50 INJECTION, SOLUTION INTRAMUSCULAR; INTRAVENOUS at 18:57

## 2020-01-01 RX ADMIN — ASPIRIN 81 MG: 81 TABLET, COATED ORAL at 10:27

## 2020-01-01 RX ADMIN — Medication 10 ML: at 01:54

## 2020-01-01 RX ADMIN — Medication 1 MG: at 18:12

## 2020-01-01 RX ADMIN — FAMOTIDINE 20 MG: 10 INJECTION, SOLUTION INTRAVENOUS at 12:56

## 2020-01-01 RX ADMIN — KETOROLAC TROMETHAMINE 7.5 MG: 15 INJECTION, SOLUTION INTRAMUSCULAR; INTRAVENOUS at 01:53

## 2020-01-01 RX ADMIN — ROPIVACAINE HYDROCHLORIDE 20 ML: 5 INJECTION, SOLUTION EPIDURAL; INFILTRATION; PERINEURAL at 09:40

## 2020-01-01 RX ADMIN — LORAZEPAM 1 MG: 2 INJECTION INTRAMUSCULAR; INTRAVENOUS at 18:19

## 2020-01-01 RX ADMIN — FAMOTIDINE 10 MG: 20 TABLET ORAL at 10:27

## 2020-01-01 RX ADMIN — ASPIRIN 81 MG: 81 TABLET, COATED ORAL at 09:40

## 2020-01-01 RX ADMIN — LABETALOL HYDROCHLORIDE 10 MG: 5 INJECTION, SOLUTION INTRAVENOUS at 11:10

## 2020-01-01 RX ADMIN — ACETAMINOPHEN 650 MG: 325 TABLET, FILM COATED ORAL at 03:14

## 2020-01-01 RX ADMIN — CEFAZOLIN 2000 MG: 1 INJECTION, POWDER, FOR SOLUTION INTRAMUSCULAR; INTRAVENOUS at 09:47

## 2020-01-01 RX ADMIN — KETOROLAC TROMETHAMINE 7.5 MG: 15 INJECTION, SOLUTION INTRAMUSCULAR; INTRAVENOUS at 16:22

## 2020-01-01 RX ADMIN — BUPIVACAINE HYDROCHLORIDE 10 MG: 5 INJECTION, SOLUTION EPIDURAL; INTRACAUDAL at 09:24

## 2020-01-01 RX ADMIN — CEFTRIAXONE SODIUM 1 G: 1 INJECTION, POWDER, FOR SOLUTION INTRAMUSCULAR; INTRAVENOUS at 03:15

## 2020-01-01 RX ADMIN — LABETALOL HYDROCHLORIDE 10 MG: 5 INJECTION, SOLUTION INTRAVENOUS at 15:33

## 2020-01-01 RX ADMIN — ENOXAPARIN SODIUM 30 MG: 30 INJECTION SUBCUTANEOUS at 09:40

## 2020-01-01 RX ADMIN — SODIUM CHLORIDE: 9 INJECTION, SOLUTION INTRAVENOUS at 10:35

## 2020-01-01 RX ADMIN — CEFTRIAXONE SODIUM 1 G: 1 INJECTION, POWDER, FOR SOLUTION INTRAMUSCULAR; INTRAVENOUS at 02:56

## 2020-01-01 RX ADMIN — ATENOLOL 100 MG: 50 TABLET ORAL at 07:57

## 2020-01-01 RX ADMIN — ATENOLOL 100 MG: 50 TABLET ORAL at 09:40

## 2020-01-01 RX ADMIN — LISINOPRIL 20 MG: 20 TABLET ORAL at 14:07

## 2020-01-01 RX ADMIN — LISINOPRIL 10 MG: 10 TABLET ORAL at 10:27

## 2020-01-01 RX ADMIN — IOPAMIDOL 100 ML: 612 INJECTION, SOLUTION INTRAVENOUS at 00:25

## 2020-01-01 RX ADMIN — HYDRALAZINE HYDROCHLORIDE 10 MG: 20 INJECTION INTRAMUSCULAR; INTRAVENOUS at 11:52

## 2020-01-01 RX ADMIN — LISINOPRIL 10 MG: 10 TABLET ORAL at 09:40

## 2020-01-01 RX ADMIN — Medication 10 ML: at 02:56

## 2020-01-01 RX ADMIN — ACETAMINOPHEN 650 MG: 325 TABLET, FILM COATED ORAL at 12:32

## 2020-01-01 RX ADMIN — PROPOFOL 20 MG: 10 INJECTION, EMULSION INTRAVENOUS at 09:15

## 2020-01-01 RX ADMIN — Medication 10 ML: at 09:41

## 2020-01-01 RX ADMIN — KETOROLAC TROMETHAMINE 7.5 MG: 15 INJECTION, SOLUTION INTRAMUSCULAR; INTRAVENOUS at 06:14

## 2020-01-01 RX ADMIN — Medication 1 MG: at 18:09

## 2020-01-01 RX ADMIN — AZITHROMYCIN MONOHYDRATE 500 MG: 500 INJECTION, POWDER, LYOPHILIZED, FOR SOLUTION INTRAVENOUS at 01:48

## 2020-01-01 RX ADMIN — SODIUM CHLORIDE: 9 INJECTION, SOLUTION INTRAVENOUS at 09:14

## 2020-01-01 RX ADMIN — SODIUM CHLORIDE 1000 ML: 9 INJECTION, SOLUTION INTRAVENOUS at 18:57

## 2020-01-01 ASSESSMENT — ENCOUNTER SYMPTOMS
CHEST TIGHTNESS: 0
VOMITING: 0
DIARRHEA: 0
RHINORRHEA: 0
CONSTIPATION: 0
ABDOMINAL DISTENTION: 0
PHOTOPHOBIA: 0
EYE PAIN: 0
NAUSEA: 0
ABDOMINAL PAIN: 0
CONSTIPATION: 0
COUGH: 0
COUGH: 1
SORE THROAT: 0
TROUBLE SWALLOWING: 0
WHEEZING: 0
SHORTNESS OF BREATH: 0
BACK PAIN: 0
RHINORRHEA: 0
EYE DISCHARGE: 0
SHORTNESS OF BREATH: 0
SORE THROAT: 0
BACK PAIN: 0
COLOR CHANGE: 0
ABDOMINAL PAIN: 0

## 2020-01-01 ASSESSMENT — PULMONARY FUNCTION TESTS
PIF_VALUE: 0
PIF_VALUE: 1
PIF_VALUE: 1
PIF_VALUE: 0
PIF_VALUE: 1
PIF_VALUE: 0
PIF_VALUE: 1
PIF_VALUE: 0
PIF_VALUE: 1
PIF_VALUE: 0

## 2020-01-01 ASSESSMENT — PAIN SCALES - GENERAL
PAINLEVEL_OUTOF10: 7
PAINLEVEL_OUTOF10: 4
PAINLEVEL_OUTOF10: 3
PAINLEVEL_OUTOF10: 1
PAINLEVEL_OUTOF10: 5
PAINLEVEL_OUTOF10: 10
PAINLEVEL_OUTOF10: 5
PAINLEVEL_OUTOF10: 8

## 2020-01-01 ASSESSMENT — PAIN DESCRIPTION - DESCRIPTORS
DESCRIPTORS: ACHING
DESCRIPTORS: ACHING

## 2020-01-01 ASSESSMENT — PAIN DESCRIPTION - FREQUENCY: FREQUENCY: INTERMITTENT

## 2020-01-01 ASSESSMENT — PAIN DESCRIPTION - LOCATION
LOCATION: LEG

## 2020-01-01 ASSESSMENT — PAIN DESCRIPTION - ONSET: ONSET: GRADUAL

## 2020-01-01 ASSESSMENT — PAIN DESCRIPTION - ORIENTATION
ORIENTATION: RIGHT

## 2020-01-01 ASSESSMENT — PAIN DESCRIPTION - PROGRESSION
CLINICAL_PROGRESSION: NOT CHANGED

## 2020-01-01 ASSESSMENT — PAIN DESCRIPTION - PAIN TYPE
TYPE: ACUTE PAIN

## 2020-08-31 NOTE — ED NOTES
Patient to restroom via w/c via this tech. Patient's gait is slow and steady. Patient was instructed to pull nurse call when done.       Christiano Dubois  08/31/20 4529

## 2020-08-31 NOTE — ED PROVIDER NOTES
of the review of systems was reviewed and negative. PAST MEDICAL HISTORY     Past Medical History:   Diagnosis Date    Hypertension          SURGICALHISTORY       Past Surgical History:   Procedure Laterality Date    APPENDECTOMY      CHOLECYSTECTOMY      EYE SURGERY      HYSTERECTOMY           CURRENT MEDICATIONS       Previous Medications    No medications on file       ALLERGIES     Patient has no known allergies. FAMILY HISTORY     History reviewed. No pertinent family history. SOCIAL HISTORY       Social History     Socioeconomic History    Marital status:       Spouse name: None    Number of children: None    Years of education: None    Highest education level: None   Occupational History    None   Social Needs    Financial resource strain: None    Food insecurity     Worry: None     Inability: None    Transportation needs     Medical: None     Non-medical: None   Tobacco Use    Smoking status: Never Smoker    Smokeless tobacco: Never Used   Substance and Sexual Activity    Alcohol use: Not Currently    Drug use: Never    Sexual activity: None   Lifestyle    Physical activity     Days per week: None     Minutes per session: None    Stress: None   Relationships    Social connections     Talks on phone: None     Gets together: None     Attends Taoism service: None     Active member of club or organization: None     Attends meetings of clubs or organizations: None     Relationship status: None    Intimate partner violence     Fear of current or ex partner: None     Emotionally abused: None     Physically abused: None     Forced sexual activity: None   Other Topics Concern    None   Social History Narrative    None       SCREENINGS    Cammal Coma Scale  Eye Opening: Spontaneous  Best Verbal Response: Oriented  Best Motor Response: Obeys commands  Cammal Coma Scale Score: 15 @FLOW([id number])@      PHYSICAL EXAM    (up to 7 for level 4, 8 or more for level 5)     ED at the time ofthis note:    CT Head WO Contrast   Final Result   Impression:      Extracalvarial midline occipital scalp hematoma. Other findings discussed. All CT scans at this facility use dose modulation, iterative reconstruction, and/or weight based dosing when appropriate to reduce radiation dose to as low as reasonably achievable. CT CERVICAL SPINE WO CONTRAST   Final Result      No fracture. Severe degenerative change cervical spine. Emphysema. All CT scans at this facility use dose modulation, iterative reconstruction, and/or weight based dosing when appropriate to reduce radiation dose to as low as reasonably achievable. ED BEDSIDE ULTRASOUND:   Performed by ED Physician - none    LABS:  Labs Reviewed   COMPREHENSIVE METABOLIC PANEL - Abnormal; Notable for the following components:       Result Value    CO2 19 (*)     Glucose 101 (*)     GFR Non- 58.6 (*)     Calcium 10.2 (*)     All other components within normal limits   CBC WITH AUTO DIFFERENTIAL - Abnormal; Notable for the following components:    RBC 5.47 (*)     Hemoglobin 16.8 (*)     Hematocrit 50.2 (*)     All other components within normal limits   PROTIME-INR   APTT       All other labs were within normal range or not returned as of this dictation. EMERGENCY DEPARTMENT COURSE and DIFFERENTIAL DIAGNOSIS/MDM:   Vitals:    Vitals:    08/31/20 1235 08/31/20 1236 08/31/20 1340   BP: (!) 229/113  (!) 203/73   Pulse: 80 73 68   Resp:  18 16   Temp:  98 °F (36.7 °C)    TempSrc:  Oral    SpO2: 98% 94% 93%   Weight:  115 lb (52.2 kg)    Height:  5' (1.524 m)           MDM  Number of Diagnoses or Management Options  Closed head injury, initial encounter:   Laceration of scalp, initial encounter:   Diagnosis management comments: Patient has occipital hematoma approximately 3 cm in diameter.   With an area of abrasion, which is minimally bleeding on arrival.  CT scan of the head and cervical spine shows no acute fractures, no acute intracranial process. Area was cleansed, and anesthetized with lidocaine with epinephrine, bleeding was controlled at that point. Patient was sent home with a diagnosis of closed head injury, scalp abrasion laceration. Derma Flex was applied to the area of laceration to reduce risk for any recurrent bleeding. She was advised if he has any worsening symptoms, headaches, nausea vomiting or dizziness to return to the ER for reevaluation. She is otherwise advised to follow-up with her family doctor next 2 to 3 days. CRITICAL CARE TIME   Total Critical Care time was 0 minutes, excluding separately reportableprocedures. There was a high probability of clinicallysignificant/life threatening deterioration in the patient's condition which required my urgent intervention. CONSULTS:  None    PROCEDURES:  Unless otherwise noted below, none     Procedures    FINAL IMPRESSION      1. Closed head injury, initial encounter    2.  Laceration of scalp, initial encounter          DISPOSITION/PLAN   DISPOSITION Decision To Discharge 08/31/2020 02:15:42 PM      PATIENT REFERRED TO:  Burgess Lilliana DO  [address] [facility] [address] [id number]  [phone]    In 3 days        DISCHARGE MEDICATIONS:  New Prescriptions    No medications on file          (Please note that portions of this note were completed with a voice recognition program.  Efforts were made to edit the dictations but occasionally words are mis-transcribed.)    Francisco Dubois PA-C (electronically signed)  Attending Emergency Physician         Francisco Dubois PA-C  08/31/20 3598

## 2020-08-31 NOTE — ED NOTES
Pt has a golf ball sized hematoma and approx 0.5cm laceration, bleeding controlled.      Carlo Montes De Oca RN  08/31/20 2022

## 2020-09-19 PROBLEM — J18.9 PNEUMONIA: Status: ACTIVE | Noted: 2020-01-01

## 2020-09-19 PROBLEM — J96.00 ACUTE RESPIRATORY FAILURE (HCC): Status: ACTIVE | Noted: 2020-01-01

## 2020-09-19 PROBLEM — I10 HTN (HYPERTENSION): Status: ACTIVE | Noted: 2020-01-01

## 2020-09-19 PROBLEM — D72.829 LEUKOCYTOSIS: Status: ACTIVE | Noted: 2020-01-01

## 2020-09-19 PROBLEM — M79.606 LEG PAIN: Status: ACTIVE | Noted: 2020-01-01

## 2020-09-19 PROBLEM — K21.9 GERD (GASTROESOPHAGEAL REFLUX DISEASE): Status: ACTIVE | Noted: 2020-01-01

## 2020-09-19 NOTE — ED NOTES
Portable cxr at bedside. LS CTA diminished in the bases. No retractions noted.  Neg pedal edema evident      Rehana Raza RN  09/18/20 1686

## 2020-09-19 NOTE — H&P
[facility]  MEDICINE    HISTORY AND PHYSICAL EXAM    PATIENT NAME:  Ary Rose    MRN:  [id number]  SERVICE DATE:  9/19/2020   SERVICE TIME:  2:19 AM    Primary Care Physician: Twila Shepard DO         SUBJECTIVE  CHIEF COMPLAINT:  Leg pain     HPI:  This is a 80 y.o. female w PMH HTN  who presents with pain in right leg after a fall at home. Pain in upper right leg, only w movement. No leg swelling, discoloration, bleeding or bruising. She has no injury from fall, did not hit head. No fracture on      PAST MEDICAL HISTORY:    Past Medical History:   Diagnosis Date    Hypertension      PAST SURGICAL HISTORY:    Past Surgical History:   Procedure Laterality Date    APPENDECTOMY      CHOLECYSTECTOMY      EYE SURGERY      HYSTERECTOMY       FAMILY HISTORY:  History reviewed. No pertinent family history. SOCIAL HISTORY:    Social History     Socioeconomic History    Marital status:       Spouse name: Not on file    Number of children: Not on file    Years of education: Not on file    Highest education level: Not on file   Occupational History    Not on file   Social Needs    Financial resource strain: Not on file    Food insecurity     Worry: Not on file     Inability: Not on file    Transportation needs     Medical: Not on file     Non-medical: Not on file   Tobacco Use    Smoking status: Never Smoker    Smokeless tobacco: Never Used   Substance and Sexual Activity    Alcohol use: Not Currently    Drug use: Never    Sexual activity: Not on file   Lifestyle    Physical activity     Days per week: Not on file     Minutes per session: Not on file    Stress: Not on file   Relationships    Social connections     Talks on phone: Not on file     Gets together: Not on file     Attends Temple service: Not on file     Active member of club or organization: Not on file     Attends meetings of clubs or organizations: Not on file     Relationship status: Not on file  Intimate partner violence     Fear of current or ex partner: Not on file     Emotionally abused: Not on file     Physically abused: Not on file     Forced sexual activity: Not on file   Other Topics Concern    Not on file   Social History Narrative    Not on file     MEDICATIONS:   Prior to Admission medications    Not on File       ALLERGIES: Patient has no known allergies. REVIEW OF SYSTEM:   Review of Systems   Constitutional: Negative for activity change, chills and fever. HENT: Negative for sore throat and trouble swallowing. Eyes: Negative for visual disturbance. Respiratory: Positive for cough. Negative for chest tightness, shortness of breath and wheezing. Rare cough   Cardiovascular: Negative for chest pain, palpitations and leg swelling. Gastrointestinal: Negative for abdominal pain, constipation, diarrhea, nausea and vomiting. Genitourinary: Negative for dysuria, flank pain and hematuria. Musculoskeletal: Positive for gait problem. Negative for back pain. Right leg pain w movement. Skin: Negative for rash. Neurological: Negative for dizziness, tremors, syncope and numbness. Psychiatric/Behavioral: Negative for behavioral problems and confusion. The patient is not nervous/anxious. OBJECTIVE  PHYSICAL EXAM: BP (!) 138/59   Pulse 72   Temp 98.2 °F (36.8 °C) (Oral)   Resp 22   Ht 5' 2\" (1.575 m)   Wt 115 lb (52.2 kg)   SpO2 91%   BMI 21.03 kg/m²     Physical Exam  Constitutional:       General: She is not in acute distress. Comments: Frail, elderly   HENT:      Head: Normocephalic. Nose: Nose normal.      Mouth/Throat:      Mouth: Mucous membranes are dry. Eyes:      Pupils: Pupils are equal, round, and reactive to light. Neck:      Musculoskeletal: No muscular tenderness. Vascular: No carotid bruit. Cardiovascular:      Rate and Rhythm: Normal rate and regular rhythm. Pulses: Normal pulses.       Heart sounds: Normal heart 46.5 37.0 - 47.0 %    MCV 92.3 82.0 - 100.0 fL    MCH 30.5 27.0 - 31.3 pg    MCHC 33.1 33.0 - 37.0 %    RDW 13.1 11.5 - 14.5 %    Platelets 782 606 - 876 K/uL    PLATELET SLIDE REVIEW Adequate     Neutrophils % 84.0 %    Lymphocytes % 5.1 %    Monocytes % 10.6 %    Eosinophils % 0.1 %    Basophils % 0.2 %    Neutrophils Absolute 13.3 (H) 1.4 - 6.5 K/uL    Lymphocytes Absolute 0.8 (L) 1.0 - 4.8 K/uL    Monocytes Absolute 1.7 (H) 0.2 - 0.8 K/uL    Eosinophils Absolute 0.0 0.0 - 0.7 K/uL    Basophils Absolute 0.0 0.0 - 0.2 K/uL    RBC Morphology Normal    Protime-INR    Collection Time: 09/18/20  6:30 PM   Result Value Ref Range    Protime 13.5 12.3 - 14.9 sec    INR 1.0    APTT    Collection Time: 09/18/20  6:30 PM   Result Value Ref Range    aPTT 33.3 24.4 - 36.8 sec   POCT Arterial    Collection Time: 09/18/20 10:54 PM   Result Value Ref Range    POC Sodium 138 136 - 145 mEq/L    POC Potassium 4.0 3.5 - 5.1 mEq/L    POC Chloride 109 99 - 110 mEq/L    POC Glucose 126 (H) 60 - 115 mg/dl    POC Creatinine 1.0 0.6 - 1.2 mg/dL    GFR Non- 51 (A) >60    GFR African American >60 >60    Calcium, Ion 1.22 1.12 - 1.32 mmol/L    pH, Arterial 7.410 7.350 - 7.450    pCO2, Arterial 29 (L) 35 - 45 mm Hg    pO2, Arterial 59 (HH) 75 - 108 mm Hg    HCO3, Arterial 18.6 (L) 21.0 - 29.0 mmol/L    Base Excess, Arterial -6 (L) -3 - 3    O2 Sat, Arterial 91 (HH) 93 - 100 %    TCO2, Arterial 20 (L) 22 - 29    Lactate 1.03 0.40 - 2.00 mmol/L    POC Hematocrit 45 36 - 48 %    Hemoglobin 15.4 12.0 - 16.0 gm/dL    FIO2 28.000     Sample Type ART     Performed on SEE BELOW        IMAGING:  No results found. VTE Prophylaxis: low molecular weight heparin -  start    ASSESSMENT AND PLAN  Active Problems:   r/o Pneumonia   No distress   Afebrile    Bilateral opacities   CT pending  WBC  Elevated. Hypoxia. Plan: admit   Rocephin/azithromycin. O2 4L  procalcitonin      Consider ID consult      Leg pain  On right since fall.

## 2020-09-19 NOTE — CARE COORDINATION
Met with patient. Assisted off the bed pan and cleansed perineal area. Pt appears in good spirits and just states that \"all the xrays were good, but I don't know what is wrong with this leg\". [facility] EMERGENCY [facility] AT [place] Case Management Initial Discharge Assessment    Met with Patient to discuss discharge plan. PCP: Stephanie Hughes, DO                                Date of Last Visit: \"beginning of Sept\"    If no PCP, list provided? N/A    Discharge Planning    Living Arrangements: independently at home    Who do you live with? daughter    Who helps you with your care:  self or family    If lives at home:     Do you have any barriers navigating in your home? no    Patient can perform ADL? Yes    Current Services (outpatient and in home) :  None    Dialysis: No    Is transportation available to get to your appointments? Yes, daughter drives    DME Equipment:  yes - cane, walker, rolator    Respiratory equipment: None    Respiratory provider:  no     Pharmacy:  yes - [facility] with Medication Assistance Program?  No      Patient agreeable to [facility]? Yes, Company list given    Patient agreeable to SNF/Rehab? Declined    Other discharge needs identified? N/A    Freedom of choice list provided with basic dialogue that supports the patient's individualized plan of care/goals and shares the quality data associated with the providers. Yes    Does Patient Have a High-Risk for Readmission Diagnosis (CHF, PN, MI, COPD)? No    The plan for Transition of Care is related to the following treatment goals:pain management, increased ability to mobilize without weakness    Initial Discharge Plan? (Note: please see concurrent daily documentation for any updates after initial note).     Home with [facility] for PT    The Patient and/or patient representative: patient was provided with choice of any post-acute providers for care and equipment and agrees with discharge plan  Yes    Electronically signed by Huber Alonso Ayesha Beltran RN on 9/19/2020 at 2:23 PM

## 2020-09-19 NOTE — PROGRESS NOTES
[facility] EMERGENCY [facility] AT [place] Respiratory Therapy Evaluation   Current Order:  Albuterol Q4 PRN    Home Regimen: QHS MDI     Ordering Physician: Iftikhar Nunez  Re-evaluation Date:  9/22     Diagnosis: Leg Pain      Patient Status: Stable / Unstable + Physician notified    The following MDI Criteria must be met in order to convert aerosol to MDI with spacer. If unable to meet, MDI will be converted to aerosol:  []  Patient able to demonstrate the ability to use MDI effectively  []  Patient alert and cooperative  []  Patient able to take deep breath with 5-10 second hold  []  Medication(s) available in this delivery method   []  Peak flow greater than or equal to 200 ml/min            Current Order Substituted To  (same drug, same frequency)   Aerosol to MDI [] Albuterol Sulfate 0.083% unit dose by aerosol Albuterol Sulfate MDI 2 puffs by inhalation with spacer    [] Levalbuterol 1.25 mg unit dose by aerosol Levalbuterol MDI 2 puffs by inhalation with spacer    [] Levalbuterol 0.63 mg unit dose by aerosol Levalbuterol MDI 2 puffs by inhalation with spacer    [] Ipratropium Bromide 0.02% unit dose by aerosol Ipratropium Bromide MDI 2 puffs by inhalation with spacer    [] Duoneb (Ipratropium + Albuterol) unit dose by aerosol Ipratropium MDI + Albuterol MDI 2 puffs by inhalation w/spacer   MDI to Aerosol [] Albuterol Sulfate MDI Albuterol Sulfate 0.083% unit dose by aerosol    [] Levalbuterol MDI 2 puffs by inhalation Levalbuterol 1.25 mg unit dose by aerosol    [] Ipratropium Bromide MDI by inhalation Ipratropium Bromide 0.02% unit dose by aerosol    [] Combivent (Ipratropium + Albuterol) MDI by inhalation Duoneb (Ipratropium + Albuterol) unit dose by aerosol   Treatment Assessment [Frequency/Schedule]:  Change frequency to: _______No changes___________________________________________per Protocol, P&T, MEC      Points 0 1 2 3 4   Pulmonary Status  Non-Smoker  [x]   Smoking history   < 20 pack years  []   Smoking history  ?  20 pack years  [] Pulmonary Disorder  (acute or chronic)  []   Severe or Chronic w/ Exacerbation  []     Surgical Status No [x]   Surgeries     General []   Surgery Lower []   Abdominal Thoracic or []   Upper Abdominal Thoracic with  PulmonaryDisorder  []     Chest X-ray Clear/Not  Ordered     []  Chronic Changes  Results Pending  [x]  Infiltrates, atelectasis, pleural effusion, or edema  []  Infiltrates in more than one lobe []  Infiltrate + Atelectasis, &/or pleural effusion  []    Respiratory Pattern Regular,  RR = 12-20 [x]  Increased,  RR = 21-25 []  WISDOM, irregular,  or RR = 26-30 []  Decreased FEV1  or RR = 31-35 []  Severe SOB, use  of accessory muscles, or RR ? 35  []    Mental Status Alert, oriented,  Cooperative [x]  Confused but Follows commands []  Lethargic or unable to follow commands []  Obtunded  []  Comatose  []    Breath Sounds Clear to  auscultation  []  Decreased unilaterally or  in bases only []  Decreased  bilaterally  [x]  Crackles or intermittent wheezes []  Wheezes []    Cough Strong, Spontan., & nonproductive [x]  Strong,  spontaneous, &  productive []  Weak,  Nonproductive []  Weak, productive or  with wheezes []  No spontaneous  cough or may require suctioning []    Level of Activity Ambulatory []  Ambulatory w/ Assist  [x]  Non-ambulatory []  Paraplegic []  Quadriplegic []    Total    Score:___4___     Triage Score:____5____      Tri       Triage:     1. (>20) Freq: Q3    2. (16-20) Freq: Q4   3. (11-15) Freq: QID & Albuterol Q2 PRN    4. (6-10) Freq: TID & Albuterol Q2 PRN    5. (0-5) Freq Q4prn

## 2020-09-19 NOTE — ED PROVIDER NOTES
If you are asked to answer a survey from [facility], please do so and let them know how I did at your visit today.     Do not take any benzodiazepines as the interaction with suboxone (and any opioid) can cause you to stop breathing. May use melatonin for sleep. Take your lovastatin as early as supper or as late as bedtime. Continue cephalexin (2 -500 mg. Tabs) as soon as you get up, mid-day and before you go to bed (if you can't take them every 8 hours).  Labs in 1 month follow up with Dr. Sharma   MLOZ  4W MED SURG UNIT  [facility]      Pt Name: Eugenia Godoy  MRN: [id number]  [place] 7/27/1922  Date ofevaluation: 9/18/2020  Provider: Jovan Aguillon Dr       Chief Complaint   Patient presents with    Leg Pain     Right leg from falling         HISTORY OF PRESENT ILLNESS   (Location/Symptom, Timing/Onset,Context/Setting, Quality, Duration, Modifying Factors, Severity)  Note limiting factors. Eugenia Godoy is a 80 y.o. female who presents to the emergency department right leg pain from falling. Patient states she lost her footing on the carpet and fell landed on her right leg hip. She did not hit her head or lose consciousness. Patient states that she has pain with movement. Her family that she lives with her daughter helped her to get back up. She denies any changes in her vision headache chest pain shortness of breath or difficulty with breathing. She denies any abdominal pain back pain. HPI    NursingNotes were reviewed. REVIEW OF SYSTEMS    (2-9 systems for level 4, 10 or more for level 5)     Review of Systems   Constitutional: Negative for chills, diaphoresis, fatigue and fever. HENT: Negative for congestion, rhinorrhea and sore throat. Eyes: Negative for photophobia and pain. Respiratory: Negative for cough and shortness of breath. Cardiovascular: Negative for chest pain and palpitations. Gastrointestinal: Negative for abdominal pain, diarrhea, nausea and vomiting. Genitourinary: Negative for dysuria and flank pain. Musculoskeletal: Positive for arthralgias. Negative for back pain. Skin: Negative for rash. Neurological: Negative for dizziness, light-headedness and headaches. Psychiatric/Behavioral: Negative. All other systems reviewed and are negative. Except as noted above the remainder of the review of systems was reviewed and negative.        PAST MEDICAL HISTORY     Past Medical History:   Diagnosis Date    Hypertension          SURGICALHISTORY       Past Surgical History:   Procedure Laterality Date    APPENDECTOMY      CHOLECYSTECTOMY      EYE SURGERY      HYSTERECTOMY           CURRENT MEDICATIONS       Current Discharge Medication List      CONTINUE these medications which have NOT CHANGED    Details   ASPIRIN 81 PO Take 81 mg by mouth daily      albuterol sulfate  (90 Base) MCG/ACT inhaler Inhale 90 g into the lungs nightly      atenolol (TENORMIN) 50 MG tablet Take 100 mg by mouth daily      lisinopril (PRINIVIL;ZESTRIL) 10 MG tablet Take 10 mg by mouth daily             ALLERGIES     Patient has no known allergies. FAMILY HISTORY     History reviewed. No pertinent family history. SOCIAL HISTORY       Social History     Socioeconomic History    Marital status:       Spouse name: None    Number of children: None    Years of education: None    Highest education level: None   Occupational History    None   Social Needs    Financial resource strain: None    Food insecurity     Worry: None     Inability: None    Transportation needs     Medical: None     Non-medical: None   Tobacco Use    Smoking status: Never Smoker    Smokeless tobacco: Never Used   Substance and Sexual Activity    Alcohol use: Not Currently    Drug use: Never    Sexual activity: None   Lifestyle    Physical activity     Days per week: None     Minutes per session: None    Stress: None   Relationships    Social connections     Talks on phone: None     Gets together: None     Attends Worship service: None     Active member of club or organization: None     Attends meetings of clubs or organizations: None     Relationship status: None    Intimate partner violence     Fear of current or ex partner: None     Emotionally abused: None     Physically abused: None     Forced sexual activity: None   Other Topics Concern    None   Social History Narrative    None       SCREENINGS    [place] Coma Scale  Eye Opening: Spontaneous  Best Verbal Response: Oriented  Best Motor Response: Obeys commands  [place] Coma Scale Score: 15 @FLOW([id number])@      PHYSICAL EXAM    (up to 7 for level 4, 8 or more for level 5)     ED Triage Vitals   BP Temp Temp Source Pulse Resp SpO2 Height Weight   09/18/20 1801 09/18/20 1802 09/18/20 1802 09/18/20 1801 09/18/20 1801 09/18/20 1801 09/18/20 1802 09/18/20 1802   (!) 158/74 98.2 °F (36.8 °C) Oral 74 20 96 % 5' 2\" (1.575 m) 115 lb (52.2 kg)       Physical Exam  Vitals signs and nursing note reviewed. Constitutional:       General: She is not in acute distress. Appearance: Normal appearance. She is well-developed. She is not diaphoretic. HENT:      Head: Normocephalic and atraumatic. Eyes:      General: Lids are normal.      Conjunctiva/sclera: Conjunctivae normal.   Neck:      Musculoskeletal: Normal range of motion and neck supple. Cardiovascular:      Rate and Rhythm: Normal rate and regular rhythm. Pulses: Normal pulses. Heart sounds: Normal heart sounds. Pulmonary:      Effort: Pulmonary effort is normal.      Breath sounds: Normal breath sounds. Abdominal:      General: Bowel sounds are normal.      Palpations: Abdomen is soft. Tenderness: There is no abdominal tenderness. Musculoskeletal:      Right hip: She exhibits decreased range of motion, tenderness and bony tenderness. Comments: 2+ distal pulse sensation intact light touch. Lymphadenopathy:      Cervical: No cervical adenopathy. Skin:     General: Skin is warm and dry. Capillary Refill: Capillary refill takes less than 2 seconds. Findings: No rash. Neurological:      Mental Status: She is alert and oriented to person, place, and time. Psychiatric:         Thought Content:  Thought content normal.         Judgment: Judgment normal.         DIAGNOSTIC RESULTS     EKG: All EKG's are interpreted by the Emergency Department Physician who either signs or Co-signsthis chart in the absence of a cardiologist.        RADIOLOGY:   Gaile Bradshaw such as CT, Ultrasound and MRI are read by the radiologist. Plain radiographic images are visualized and preliminarily interpreted by the emergency physician with the below findings:        Interpretation per the Radiologist below, if available at the time ofthis note:    XR PELVIS (1-2 VIEWS)    (Results Pending)   XR FEMUR RIGHT (MIN 2 VIEWS)    (Results Pending)   CT HIP RIGHT WO CONTRAST    (Results Pending)   CT LUMBAR SPINE WO CONTRAST    (Results Pending)   XR CHEST PORTABLE    (Results Pending)   CTA Chest W WO  (PE study)    (Results Pending)         ED BEDSIDE ULTRASOUND:   Performed by ED Physician - none    LABS:  Labs Reviewed   COMPREHENSIVE METABOLIC PANEL - Abnormal; Notable for the following components:       Result Value    Sodium 133 (*)     CO2 19 (*)     Glucose 107 (*)     BUN 29 (*)     CREATININE 0.97 (*)     GFR Non- 53.0 (*)     All other components within normal limits   CBC WITH AUTO DIFFERENTIAL - Abnormal; Notable for the following components:    WBC 15.8 (*)     Neutrophils Absolute 13.3 (*)     Lymphocytes Absolute 0.8 (*)     Monocytes Absolute 1.7 (*)     All other components within normal limits   POCT ARTERIAL - Abnormal; Notable for the following components:    POC Glucose 126 (*)     GFR Non-African American 51 (*)     pCO2, Arterial 29 (*)     pO2, Arterial 59 (*)     HCO3, Arterial 18.6 (*)     Base Excess, Arterial -6 (*)     O2 Sat, Arterial 91 (*)     TCO2, Arterial 20 (*)     All other components within normal limits   PROTIME-INR   APTT   COMPREHENSIVE METABOLIC PANEL   CBC WITH AUTO DIFFERENTIAL   PROTIME-INR   APTT   COMPREHENSIVE METABOLIC PANEL W/ REFLEX TO MG FOR LOW K   TYPE AND SCREEN   TYPE AND SCREEN       All other labs were within normal range or not returned as of this dictation.     EMERGENCY DEPARTMENT COURSE and DIFFERENTIAL DIAGNOSIS/MDM:   Vitals:    Vitals:    09/18/20 2256 09/18/20 2344 09/19/20 0107 09/19/20 0237   BP:  (!) 146/59 (!) 138/59 (!) 189/68   Pulse:  76 72 82   Resp:  (!) 31 22 20   Temp:    98.3 °F (36.8 °C)   TempSrc:    Oral   SpO2: (!) 88% 92% 91%    Weight:       Height:               MDM    Patient is nontoxic no acute distress she is afebrile. Incidental finding of hypoxia has been found patient is satting on room air on 80%. Upon further questioning patient states that she has been having a little bit of sinus issues and occasional cough but denies any fevers or difficulty with her breathing. Patient does have leukocytosis to 15,000. Chest x-ray concerning for right lower lobe infiltrate. Will treat with azithromycin and Rocephin. Will need further evaluation and treatment for this hypoxia as she does not wear oxygen at home. CT of chest shows no PE. Hospitalist Dr. Ireland Finely accepted the patient seen the patient in the ED and is stable and ready for admission. REASSESSMENT          CRITICAL CARE TIME   Total Critical Care time was  minutes, excluding separatelyreportable procedures. There was a high probability ofclinically significant/life threatening deterioration in the patient's condition which required my urgent intervention. CONSULTS:  None    PROCEDURES:  Unless otherwise noted below, none     Procedures    FINAL IMPRESSION      1. Pneumonia due to organism    2. Hypoxia    3.  Right leg pain          DISPOSITION/PLAN   DISPOSITION Admitted 09/19/2020 01:14:21 AM      PATIENT REFERREDTO:  Michell Marie DO  [facility] [address]  [phone]            DISCHARGEMEDICATIONS:  Current Discharge Medication List             (Please note that portions of this note were completed with a voice recognition program.  Efforts were made to edit the dictations but occasionally words are mis-transcribed.)    Camille Luna PA-C (electronically signed)  Attending Emergency Physician         Camille Luna

## 2020-09-19 NOTE — PROGRESS NOTES
Assumed care of pt at 0700, assessment performed. Pt complains of pain in R thigh, aggravated by movement. Dr. Aubrie Noel notified and orders received. Daughter, Cee Hill, updated on care. At approximately 1750, pt in room with NC off, alert and oriented, 77% on RA. Pt placed on 3L, slow to recover, O2 increased to 5L and pt returned to 94%. Dr. Aubrie Noel notified. Pt left resting in bed with call light within reach. Will continue to monitor.  Electronically signed by Nazia Mcgowan RN on 9/19/2020 at 6:40 PM

## 2020-09-19 NOTE — PROGRESS NOTES
Physician Progress Note      PATIENTBonnell Apley  CSN #:                  [id number]  :                       1922  ADMIT DATE:       2020 5:54 PM  [address] [place] DATE:  RESPONDING  PROVIDER #:        Teresa Cleveland MD          QUERY TEXT:    Pt admitted with leukocytosis, r/o pneumonia. Pt noted to have hypoxia with   sats 80% on RA, currently on 4L with sats 91-92%. If possible, please document   in the progress notes and discharge summary if you are evaluating and/or   treating any of the following: The medical record reflects the following:  Risk Factors: possible pneumonia  Clinical Indicators: ED provider-Incidental finding of hypoxia has been found   patient is satting on room air on 80%. RR 18-31,  SpO2 80% on RA in ED, currently on 4L NC with sats 91-92%  ABG 7.410/29/59/18.6  on FIO2 28% P/F 210  Treatment: supplemental O2 at 4L, ABGs, abx, monitoring, pulmonology consult    Thank you, Darlin Crowder RN BSN CDS  [phone]  Options provided:  -- Acute respiratory failure with hypoxia  -- Hypoxia only without respiratory failure  -- Other - I will add my own diagnosis  -- Disagree - Not applicable / Not valid  -- Disagree - Clinically unable to determine / Unknown  -- Refer to Clinical Documentation Reviewer    PROVIDER RESPONSE TEXT:    This patient is in acute respiratory failure with hypoxia.     Query created by: Lorin Hargrove on 2020 1:50 PM      Electronically signed by:  Teresa Cleveland MD 2020 1:58 PM

## 2020-09-19 NOTE — ED NOTES
Pt lives at home with her daughter has one flight of stairs, two walkers, shower chair ans shower safety rails. Does her own housekeeping and walks daily to end of drive and to garage to maintain wellness. Pt not clear as to why she fell today.        Jonh Mckeon RN  09/18/20 6316

## 2020-09-19 NOTE — ED NOTES
Pt returned from CT NAD noted. Pt awaiting admission . Pt resting in bed with eyes closed,.  Reps + unlabored      Peterson Whitaker RN  09/19/20 2952

## 2020-09-19 NOTE — PROGRESS NOTES
Arrived via cart from ED accompanied by staff at [address] [place]. Oriented to surroundings. Call light within reach. Bed low and locked with SR up x2 and bed alarm engaged. Colten Gil NP at bedside to assess. Resp easy, unlabored. Patient reports having a dry cough but none heard at this time. Breath sounds are diminished throughout. Sat 90-92% on 4L O2. Patient denies pain when still but reports right hip and leg pain when assisted with turning in the bed.

## 2020-09-19 NOTE — PROGRESS NOTES
Hospitalist Progress Note      PCP: Scott Rubi DO    Date of Admission: 9/18/2020    Chief Complaint:  No acute events, afebrile, stable HD, complaining of  RLE pain per nursing staff    Medications:  Reviewed    Infusion Medications   Scheduled Medications    sodium chloride flush  10 mL Intravenous 2 times per day    enoxaparin  30 mg Subcutaneous Daily    famotidine  10 mg Oral Daily    [START ON 9/20/2020] azithromycin  500 mg Intravenous Q24H    And    [START ON 9/20/2020] cefTRIAXone (ROCEPHIN) IV  1 g Intravenous Q24H    aspirin  81 mg Oral Daily    atenolol  100 mg Oral Daily    lisinopril  10 mg Oral Daily     PRN Meds: sodium chloride flush, acetaminophen **OR** acetaminophen, polyethylene glycol, promethazine **OR** ondansetron, albuterol      Intake/Output Summary (Last 24 hours) at 9/19/2020 1303  Last data filed at 9/19/2020 0520  Gross per 24 hour   Intake 540 ml   Output --   Net 540 ml       Exam:    BP (!) 189/68   Pulse 82   Temp 98.3 °F (36.8 °C) (Oral)   Resp 20   Ht 5' 2\" (1.575 m)   Wt 115 lb (52.2 kg)   SpO2 91%   BMI 21.03 kg/m²     General appearance: appears stated age and cooperative. Respiratory:  Coarse BS with faint wheezing bilaterally . Cardiovascular: Regular rate and rhythm with normal S1/S2. Abdomen: Soft, active bowel sounds. Musculoskeletal: No edema bilaterally. Labs:   Recent Labs     09/18/20 1830 09/18/20  2254   WBC 15.8*  --    HGB 15.4 15.4   HCT 46.5  --      --      Recent Labs     09/18/20 1830 09/18/20  2254 09/19/20  0645   *  --  136   K 4.1  --  4.1     --  106   CO2 19*  --  16*   BUN 29*  --  28*   CREATININE 0.97* 1.0 0.92*   CALCIUM 9.2  --  9.6     Recent Labs     09/18/20 1830 09/19/20  0645   AST 23 20   ALT 15 13   BILITOT 0.7 0.6   ALKPHOS 77 78     Recent Labs     09/18/20 1830   INR 1.0     No results for input(s): Caroline Pedroza in the last 72 hours.     Urinalysis:    No results found for: Joaquin Peels, BACTERIA, RBCUA, BLOODU, [address], [address]    Radiology:  CTA Chest W WO  (PE study)   Final Result      No CT evidence of pulmonary embolism. Ill-defined groundglass opacities with small bilateral pleural effusions. Findings could relate to pulmonary edema. Infection should be excluded clinically. Sequela from remote granulomatous disease. Areas of probable scarring/atelectasis in the lungs. CT HIP RIGHT WO CONTRAST   Final Result      No CT evidence for acute right hip fracture. Osteoarthritis. CT LUMBAR SPINE WO CONTRAST   Final Result      No acute fracture or traumatic subluxation. Multilevel degenerative changes. XR PELVIS (1-2 VIEWS)   Final Result      No acute findings. XR FEMUR RIGHT (MIN 2 VIEWS)   Final Result      No acute findings. XR CHEST PORTABLE    (Results Pending)           Assessment/Plan:    81 y/o female with history of HTN who presented with right LE pain s/p fall at home.     Acute hypoxic respiratory failure  - CTA of the chest was negative for PE, showed ground glass opacities and bilateral pleural effusions  - started on IV Rocephin and Zithromax  - duonebs, O2 therapy  - pulmonology consulted    RLE pain s/p fall  - imaging was negative for acute findings per radiology report  - ortho consult  - pain control  - PT/OT    NAG metabolic acidosis  - check lactate level    HTN  - continue home meds            Electronically signed by Hawa Bauman MD on 9/19/2020 at 1:03 PM

## 2020-09-20 NOTE — CONSULTS
Pulmonary Medicine  Consult Note      Reason for consultation: Possible pneumonia          HISTORY OF PRESENT ILLNESS:    This is a 78-year-old white female who was admitted after a fall and right thigh injury without evidence of fracture or other significant findings, who also has had chronic low back pain which was mainly the reason for her coming to the hospital according to her. She also felt weak recently. Patient has had mild chronic cough for many years she attributes mainly to \"sinuses\" with postnasal drip. Chest x-ray suggested small areas of densities in the lower lung fields, CT of the chest showed subsegmental atelectatic changes in the dependent segments. Patient admits to significant decrease in her activity level she has been laying in bed almost all the time. Patient was also noted to be hypoxic on presentation. But adequately compensated with nasal cannula          Past Medical History:        Diagnosis Date    Hypertension        Past Surgical History:        Procedure Laterality Date    APPENDECTOMY      CHOLECYSTECTOMY      EYE SURGERY      HYSTERECTOMY         Social History:     reports that she has never smoked. She has never used smokeless tobacco. She reports previous alcohol use. She reports that she does not use drugs. Family History:   History reviewed. No pertinent family history. Allergies:  Patient has no known allergies.         MEDICATIONS during current hospitalization:    Continuous Infusions:    Scheduled Meds:   sodium chloride flush  10 mL Intravenous 2 times per day    enoxaparin  30 mg Subcutaneous Daily    famotidine  10 mg Oral Daily    azithromycin  500 mg Intravenous Q24H    And    cefTRIAXone (ROCEPHIN) IV  1 g Intravenous Q24H    aspirin  81 mg Oral Daily    atenolol  100 mg Oral Daily    lisinopril  10 mg Oral Daily       PRN Meds:sodium chloride flush, acetaminophen **OR** acetaminophen, polyethylene glycol, promethazine **OR** ondansetron, albuterol, ketorolac        REVIEW OF SYSTEMS:  As in history of present illness  Other 14 point review of system is negative. PHYSICAL EXAM:    Vitals:  /69   Pulse 82   Temp 98.3 °F (36.8 °C) (Oral)   Resp 16   Ht 5' 2\" (1.575 m)   Wt 115 lb (52.2 kg)   SpO2 91%   BMI 21.03 kg/m²   General: Patient is alert, awake . comfortable in bed, No distress. Head: Atraumatic , Normocephalic   Eyes: PERRL. No icteric sclera. No conjunctival injection. No discharge   ENT: No nasal  discharge. Pharynx clear. Neck:  Trachea midline. No thyromegaly, no JVD, No cervical adenopathy. Chest : Adequate effort, symmetric bilateral excursions  Lung : Diminished breath sounds bilaterally, few coarse rhonchi in the right base  Heart[de-identified] Regular rhythm and rate. No mumur ,  Rub or gallop  ABD: Benign. Non-tender. Non-distended. No masses or organmegaly. Normal bowel sounds. EXT: No significant pitting edema both lower extremities , No Cyanosis No clubbing  Neuro: no focal weakness  Skin: Warm and dry. No erythema or rash on exposed extremities. .      Data Review  Recent Labs     09/18/20 1830 09/18/20 2254 09/19/20  1329 09/20/20  0658   WBC 15.8*  --  11.7* 10.9*   HGB 15.4 15.4 14.8 14.5   HCT 46.5  --  44.3 43.4     --  156 133      Recent Labs     09/18/20 1830 09/18/20  2254 09/19/20  0645 09/20/20  0658   *  --  136 134*   K 4.1  --  4.1 4.8     --  106 105   CO2 19*  --  16* 14*   BUN 29*  --  28* 22   CREATININE 0.97* 1.0 0.92* 0.72   GLUCOSE 107*  --  103* 91       MV Settings:           ABGs:   Recent Labs     09/18/20 2254   PHART 7.410   SAT0JUJ 29*   PO2ART 59*   DKL4QAA 18.6*   BEART -6*   [id number] 91*   VOI1PYO 20*     O2 Device: Nasal cannula  O2 Flow Rate (L/min): 4 L/min  Lab Results   Component Value Date    LACTA 1.4 09/20/2020    LACTA 2.6 09/19/2020       Radiology  Xr Pelvis (1-2 Views)    Result Date: 9/19/2020  EXAMINATION:  XR PELVIS (1-2 VIEWS), XR FEMUR RIGHT (MIN 2 VIEWS) HISTORY:   pain    Fall . Fall with pain in the right femur. TECHNIQUE:  XR PELVIS (1-2 VIEWS), XR FEMUR RIGHT (MIN 2 VIEWS) COMPARISON: CT abdomen/pelvis from 11/3/2010 RESULT: Bony pelvis intact without acute fracture. No hip fracture. Moderate degenerative changes both hips. Mild degenerative changes SI joints. Mild degenerative changes pubic symphysis. Degenerative changes lower lumbar spine. Vascular calcifications. No other significant abnormality. ---------------------------------------------     No acute findings. Xr Femur Right (min 2 Views)    Result Date: 9/19/2020  EXAMINATION:  XR PELVIS (1-2 VIEWS), XR FEMUR RIGHT (MIN 2 VIEWS) HISTORY:   pain    Fall . Fall with pain in the right femur. TECHNIQUE:  XR PELVIS (1-2 VIEWS), XR FEMUR RIGHT (MIN 2 VIEWS) COMPARISON: CT abdomen/pelvis from 11/3/2010 RESULT: Bony pelvis intact without acute fracture. No hip fracture. Moderate degenerative changes both hips. Mild degenerative changes SI joints. Mild degenerative changes pubic symphysis. Degenerative changes lower lumbar spine. Vascular calcifications. No other significant abnormality. ---------------------------------------------     No acute findings. Ct Head Wo Contrast    Result Date: 8/31/2020  CT Brain Contrast medium:  Not utilized. History:  Fall. Hit back of head. Bump. Comparison:  CT brain, November 2, 2015, August 17, 2006. Findings: Extra-axial spaces:  Normal. Intracranial hemorrhage:  None. Ventricular system:  Normal for age. Basal Cisterns:  Normal. Cerebral Parenchyma: Bilateral symmetric periventricular areas decreased attenuation. Midline Shift:  None. Cerebellum:  Normal. Paranasal sinuses, calvarium, and mastoid air cells: Extracalvarial midline occipital soft tissue swelling measuring 0.6 x 2.2 cm. Visualized Orbits: Remote bilateral ocular surgery. Impression: Extracalvarial midline occipital scalp hematoma. Other findings discussed.  All CT scans at this facility use dose modulation, iterative reconstruction, and/or weight based dosing when appropriate to reduce radiation dose to as low as reasonably achievable. Cta Chest W Wo  (pe Study)    Result Date: 9/19/2020  EXAMINATION:  CHEST CTA WITH CONTRAST (PULMONARY EMBOLISM PROTOCOL) CLINICAL HISTORY:   hypoxia    Fall . Technique:  Spiral axial CT acquisition of the chest from the thoracic inlet to the upper abdomen following IV contrast.  2-D images were reconstructed in the sagittal and coronal planes. 3-D MIPS images were generated in the coronal and axial planes. Images were reviewed on the PACS workstation. All images including the 3-D MIPS were personally archived. Contrast:  IV administration of 100 ml Isovue 370 All CT scans at this facility use dose modulation, iterative reconstruction, and/or weight based dosing when appropriate to reduce radiation dose to as low as reasonably achievable. Comparison:  Chest x-ray 9/18/2020  RESULT: Limitations:  None. Evaluation for thromboembolic disease:      - Right heart chambers:  No thromboembolic disease.      - Main pulmonary arteries:  No thromboembolic disease.      - Lobar pulmonary arteries:  No thromboembolic disease.        - Segmental pulmonary arteries:  No thromboembolic disease.        - Subsegmental pulmonary arteries:  No thromboembolic disease in visualized portions. Lines, tubes, and devices:  None. Lung parenchyma and pleura:  Central airways are patent. Ill-defined groundglass opacities in the perihilar regions and lung bases. There is a scarring/atelectasis. Scarring within the right lung apex. Calcified granulomas. Small bilateral pleural effusions. No pneumothorax. Linear scarring/atelectasis at the right lung base. No suspicious pulmonary nodules. No pleural effusion or pneumothorax. Thoracic inlet, heart, and mediastinum: Visualized thyroid unremarkable. Extensive calcified supraclavicular, mediastinal, and hilar lymph nodes. Wo Contrast    Result Date: 9/19/2020  EXAMINATION:  CT LUMBAR SPINE WO CONTRAST HISTORY:   fall . Back pain. TECHNIQUE:  Spiral, high resolution axial images were obtained from the thoracolumbar junction to the sacrum with sagittal and coronal planar reconstructions. All CT scans at this facility use dose modulation, iterative reconstruction, and/or weight based dosing when appropriate to reduce radiation dose to as low as reasonably achievable. COMPARISON: Correlation with CT abdomen/pelvis from 11/3/2010    RESULT: Counting reference:  Lumbosacral junction. For the purposes of this report, L4-5 is considered the level of the iliac crest. Alignment:  No traumatic malalignment. Bone marrow /fracture:  No evidence of a lytic or blastic process in the visualized spine. No evidence of acute or chronic fracture. Multilevel degenerative change. Paraspinal soft tissues:  Refer to concurrent CTA chest for lung findings. Cholecystectomy. Left renal cysts. Vascular calcifications. Lower thoracic spine:  The visualized lower thoracic bony canal and foramina are without significant narrowing. T12-L1:  Disc osteophyte complex with mild canal narrowing without significant bony foraminal narrowing. L1-L2:    Disc height loss, endplate osteophytes, with mild canal narrowing without definite bony foraminal narrowing. L2-L3:    Disc height loss, endplate osteophytes, facet degenerative changes, with mild to moderate canal narrowing and at least mild to moderate bilateral foraminal narrowing. L3-L4:    Disc height loss, disc bulge, endplate osteophytes, facet degenerative changes, with at least mild to moderate canal narrowing and moderate bilateral foraminal narrowing. L4-L5:    Disc height loss, disc bulge, facet degenerative changes, with at least moderate to severe bilateral foraminal narrowing and moderate canal narrowing.  L5-S1:    Disc height loss, endplate osteophytes, facet degenerative changes, with at least moderate bilateral foraminal narrowing and mild canal narrowing. Sacrum and iliac wings:  The visualized sacrum and iliac wings are within normal limits. No acute fracture or traumatic subluxation. Multilevel degenerative changes. Xr Chest Portable    Result Date: 9/19/2020  Exam: XR CHEST PORTABLE History: Fall Technique: AP portable view of the chest obtained. Comparison: None available Findings: Atherosclerotic calcification of the thoracic aorta. The cardiomediastinal silhouette is within normal limits. Calcified mediastinal and bilateral hilar lymph nodes. Coarsening of the pulmonary interstitium. Small bilateral pleural effusions. Patchy bilateral airspace opacities. No pneumothorax. No acute osseous abnormality identified. Patchy bilateral opacities may relate to pulmonary edema or pneumonia in the appropriate clinical setting. Small bilateral pleural effusions. Ct Hip Right Wo Contrast    Result Date: 9/19/2020  CT HIP RIGHT WO CONTRAST HISTORY:  pain. normal xray. cant ambulate do to pain. r/o fracture    Fall TECHNIQUE: Routine CT of the right hip without contrast Contrast: None. All CT scans at this facility use dose modulation, iterative reconstruction, and/or weight based dosing when appropriate to reduce radiation dose to as low as reasonably achievable. COMPARISON: Radiographs 9/18/2020 RESULT: No CT evidence for acute fracture or dislocation. Moderate degenerative changes right hip. Degenerative changes lower lumbar spine, refer to concurrent separately dictated CT. Moderate degenerative changes pubic symphysis and SI joints with maintained alignment. Vascular calcifications. Osteopenia. No CT evidence for acute right hip fracture. Osteoarthritis.     Us Dup Lower Extremity Right [place]    Result Date: 9/19/2020  Venous duplex ultrasound of the right lower extremity CLINICAL HISTORY:  Leg pain since a fall COMPARISON:  None available TECHNIQUE: Vineet Aggarwal scale, duplex Doppler, with compression

## 2020-09-20 NOTE — PROGRESS NOTES
Dr. Smith Goltz notified of MRI results and daughter's request to discuss POC. Will continue to monitor.  Electronically signed by Tres Benitez RN on 9/20/2020 at 4:54 PM

## 2020-09-20 NOTE — PROGRESS NOTES
Assumed care of pt at 0700, assessment performed. Pt more confused today than yesterday. Able to state her name, the month and year, and president, aware of being in the hospital at times but unaware of what hospital. Pt crying out stating she is \"not sure what's going on\". Granddaughter in visiting. BP this AM elevated, Dr. Kelly Ramos aware and orders received. Medicated with PRN Labetolol per order for . Pt incontinent of urine, yelling out for help for bedpan for BM. Pt left resting in bed with call light within reach and bed alarm on, Avasys in room for safety. Will continue to monitor.  Electronically signed by Pema Merino RN on 9/20/2020 at 12:37 PM

## 2020-09-20 NOTE — PROGRESS NOTES
Pt in bed watching TV. A&O to self at this time. Easily reoriented. Medicated per MAR. No needs expressed at this time. No sx of distress noted. Up with 1 assist to [facility]. Fall prevention in place. Will continue to monitor. Electronically signed by Corbin Overton RN on 9/19/2020 at 11:56 PM

## 2020-09-20 NOTE — CONSULTS
Department of Orthopedic Surgery  Attending Consult Note        Reason for Consult: Right thigh pain    Chief Complaint: Right thigh pain    History Obtained From: History obtained from the patient and her granddaughter    Assessment/Plan:    [facility] [address] Problems    Diagnosis Date Noted    Leg pain [M79.606] 09/19/2020    Pneumonia [J18.9] 09/19/2020    Leukocytosis [D72.829] 09/19/2020    HTN (hypertension) [I10] 09/19/2020    GERD (gastroesophageal reflux disease) [K21.9] 09/19/2020    Acute respiratory failure ([address] [J96.00] 09/19/2020       Assessment: Severe right thigh pain  Plan: Suspect an occult fracture, that was not visualized by the x-rays or CT scan. Recommend an MRI of the right hip. History of Present Illness: The patient is a 80 y.o. female who lives at home with her daughter, and ambulates with a walker or cane fell 2 weeks ago, initially with limited pain in her right hip, that has progressively worsened over time. She denies any pain elsewhere. She did sustain a injury to her right shoulder a while ago. She denies any numbness or weakness.     Past Medical History:        Diagnosis Date    Hypertension      Past Surgical History:        Procedure Laterality Date    APPENDECTOMY      CHOLECYSTECTOMY      EYE SURGERY      HYSTERECTOMY         Current Medications:   Current Facility-Administered Medications   Medication Dose Route Frequency Provider Last Rate Last Dose    labetalol (NORMODYNE;TRANDATE) injection 10 mg  10 mg Intravenous Q4H PRN Vandana Buenrostro MD   10 mg at 09/20/20 1110    sodium chloride flush 0.9 % injection 10 mL  10 mL Intravenous 2 times per day ALTA Baird CNP   10 mL at 09/20/20 0941    sodium chloride flush 0.9 % injection 10 mL  10 mL Intravenous PRN ALTA Baird CNP   10 mL at 09/20/20 0330    acetaminophen (TYLENOL) tablet 650 mg  650 mg Oral Q6H PRN ALTA Baird CNP   650 mg at 09/19/20 1232    Or    acetaminophen (TYLENOL) suppository 650 mg  650 mg Rectal Q6H PRN Colt David APRN - CNP        polyethylene glycol (GLYCOLAX) packet 17 g  17 g Oral Daily PRN Colt Hernandez APRN - CNP        promethazine (PHENERGAN) tablet 12.5 mg  12.5 mg Oral Q6H PRN Colt DELROY HernandezN - CNP        Or    ondansetron [facility]) injection 4 mg  4 mg Intravenous Q6H PRN [place] David APRN - CNP        enoxaparin (LOVENOX) injection 30 mg  30 mg Subcutaneous Daily Colt David, APRN - CNP   30 mg at 09/20/20 0940    famotidine (PEPCID) tablet 10 mg  10 mg Oral Daily Colt David, APRN - CNP   10 mg at 09/20/20 0940    azithromycin (ZITHROMAX) 500 mg in D5W 250ml addavial  500 mg Intravenous Q24H Colt Hernandez APRN - CNP   Stopped at 09/20/20 0435    And    cefTRIAXone (ROCEPHIN) 1 g IVPB in 50 mL D5W minibag  1 g Intravenous Q24H [place] David, APRN - CNP   Stopped at 09/20/20 0330    albuterol (PROVENTIL) nebulizer solution 2.5 mg  2.5 mg Nebulization Q4H PRN Umm Chi MD        aspirin EC tablet 81 mg  81 mg Oral Daily Umm Chi MD   81 mg at 09/20/20 0940    atenolol (TENORMIN) tablet 100 mg  100 mg Oral Daily Umm Chi MD   100 mg at 09/20/20 0940    lisinopril (PRINIVIL;ZESTRIL) tablet 10 mg  10 mg Oral Daily Umm Chi MD   10 mg at 09/20/20 0940    ketorolac (TORADOL) injection 7.5 mg  7.5 mg Intravenous Q6H PRN Aleyda Vazquez MD   7.5 mg at 09/20/20 2104      Scheduled Medications:    sodium chloride flush  10 mL Intravenous 2 times per day    enoxaparin  30 mg Subcutaneous Daily    famotidine  10 mg Oral Daily    azithromycin  500 mg Intravenous Q24H    And    cefTRIAXone (ROCEPHIN) IV  1 g Intravenous Q24H    aspirin  81 mg Oral Daily    atenolol  100 mg Oral Daily    lisinopril  10 mg Oral Daily     PRN Meds: labetalol, sodium chloride flush, acetaminophen **OR** acetaminophen, polyethylene glycol, promethazine **OR** ondansetron, albuterol, ketorolac    Allergies:  Patient has no known allergies. Social History:   Social History     Socioeconomic History    Marital status:      Spouse name: Not on file    Number of children: Not on file    Years of education: Not on file    Highest education level: Not on file   Occupational History    Not on file   Social Needs    Financial resource strain: Not on file    Food insecurity     Worry: Not on file     Inability: Not on file    Transportation needs     Medical: Not on file     Non-medical: Not on file   Tobacco Use    Smoking status: Never Smoker    Smokeless tobacco: Never Used   Substance and Sexual Activity    Alcohol use: Not Currently    Drug use: Never    Sexual activity: Not on file   Lifestyle    Physical activity     Days per week: Not on file     Minutes per session: Not on file    Stress: Not on file   Relationships    Social connections     Talks on phone: Not on file     Gets together: Not on file     Attends Jew service: Not on file     Active member of club or organization: Not on file     Attends meetings of clubs or organizations: Not on file     Relationship status: Not on file    Intimate partner violence     Fear of current or ex partner: Not on file     Emotionally abused: Not on file     Physically abused: Not on file     Forced sexual activity: Not on file   Other Topics Concern    Not on file   Social History Narrative    Not on file     Family History:   History reviewed. No pertinent family history. Review of System:   Review of Systems   Constitutional: Negative. Musculoskeletal: Positive for gait problem (Except for the progressive right hip pain. ). Negative for arthralgias, joint swelling and myalgias.         Physical Emamination:  BP (!) 193/69   Pulse 76   Temp 97.5 °F (36.4 °C) (Oral)   Resp 20   Ht 5' 2\" (1.575 m)   Wt 115 lb (52.2 kg)   SpO2 91%   BMI 21.03 kg/m² Physical exam see a pleasant 80year-old woman, not apparent distress. She is alert and orientated x3. There is slight swelling of the right shoulder with no tenderness. Otherwise there is no tenderness of both wrists, both elbows, both shoulders. There is no tenderness over the spine or pelvis. Both ankles have no tenderness, with no pain. Both knees have no swelling, with no tenderness. There is no pain palpating both hips. Range of motion of the right hip produce pain with flexion, and internal and external rotation. She is neurovascular intact.     LAB:  CBC:   Lab Results   Component Value Date    WBC 10.9 09/20/2020    RBC 4.65 09/20/2020    HGB 14.5 09/20/2020    HCT 43.4 09/20/2020    MCV 93.3 09/20/2020    MCH 31.1 09/20/2020    MCHC 33.3 09/20/2020    RDW 13.3 09/20/2020     09/20/2020     CBC with Differential:    Lab Results   Component Value Date    WBC 10.9 09/20/2020    RBC 4.65 09/20/2020    HGB 14.5 09/20/2020    HCT 43.4 09/20/2020     09/20/2020    MCV 93.3 09/20/2020    MCH 31.1 09/20/2020    MCHC 33.3 09/20/2020    RDW 13.3 09/20/2020    LYMPHOPCT 10.1 09/20/2020    MONOPCT 11.4 09/20/2020    BASOPCT 0.7 09/20/2020    MONOSABS 1.2 09/20/2020    LYMPHSABS 1.1 09/20/2020    EOSABS 0.2 09/20/2020    BASOSABS 0.1 09/20/2020     CMP:    Lab Results   Component Value Date     09/20/2020    K 4.8 09/20/2020    K 4.1 09/19/2020     09/20/2020    CO2 14 09/20/2020    BUN 22 09/20/2020    CREATININE 0.72 09/20/2020    GFRAA >60.0 09/20/2020    LABGLOM >60.0 09/20/2020    GLUCOSE 91 09/20/2020    PROT 6.4 09/19/2020    LABALBU 3.2 09/20/2020    CALCIUM 9.0 09/20/2020    BILITOT 0.6 09/19/2020    ALKPHOS 78 09/19/2020    AST 20 09/19/2020    ALT 13 09/19/2020     BMP:    Lab Results   Component Value Date     09/20/2020    K 4.8 09/20/2020    K 4.1 09/19/2020     09/20/2020    CO2 14 09/20/2020    BUN 22 09/20/2020    LABALBU 3.2 09/20/2020    CREATININE 0.72

## 2020-09-20 NOTE — PROGRESS NOTES
Hospitalist Progress Note      PCP: Anali Merlos DO    Date of Admission: 9/18/2020    Chief Complaint:  No acute events, afebrile, stable HD,     Medications:  Reviewed    Infusion Medications   Scheduled Medications    [START ON 9/21/2020] lisinopril  20 mg Oral Daily    sodium chloride flush  10 mL Intravenous 2 times per day    enoxaparin  30 mg Subcutaneous Daily    famotidine  10 mg Oral Daily    azithromycin  500 mg Intravenous Q24H    And    cefTRIAXone (ROCEPHIN) IV  1 g Intravenous Q24H    aspirin  81 mg Oral Daily    atenolol  100 mg Oral Daily     PRN Meds: labetalol, sodium chloride flush, acetaminophen **OR** acetaminophen, polyethylene glycol, promethazine **OR** ondansetron, albuterol, ketorolac      Intake/Output Summary (Last 24 hours) at 9/20/2020 1405  Last data filed at 9/20/2020 0256  Gross per 24 hour   Intake 10 ml   Output --   Net 10 ml       Exam:    BP (!) 193/69   Pulse 76   Temp 97.5 °F (36.4 °C) (Oral)   Resp 20   Ht 5' 2\" (1.575 m)   Wt 115 lb (52.2 kg)   SpO2 91%   BMI 21.03 kg/m²     General appearance: appears stated age and cooperative. Respiratory:  Coarse BS with faint wheezing bilaterally . Cardiovascular: Regular rate and rhythm with normal S1/S2. Abdomen: Soft, active bowel sounds. Musculoskeletal: No edema bilaterally.      Labs:   Recent Labs     09/18/20  1830 09/18/20  2254 09/19/20  1329 09/20/20  0658   WBC 15.8*  --  11.7* 10.9*   HGB 15.4 15.4 14.8 14.5   HCT 46.5  --  44.3 43.4     --  156 133     Recent Labs     09/18/20  1830 09/18/20  2254 09/19/20  0645 09/20/20  0658   *  --  136 134*   K 4.1  --  4.1 4.8     --  106 105   CO2 19*  --  16* 14*   BUN 29*  --  28* 22   CREATININE 0.97* 1.0 0.92* 0.72   CALCIUM 9.2  --  9.6 9.0   PHOS  --   --   --  3.1     Recent Labs     09/18/20  1830 09/19/20  0645   AST 23 20   ALT 15 13   BILITOT 0.7 0.6   ALKPHOS 77 78     Recent Labs     09/18/20 1830   INR 1.0     No results for input(s): Flaco Langley in the last 72 hours. Urinalysis:    No results found for: Coit Lied, BACTERIA, RBCUA, BLOODU, [address], [address] [place] [address]    Radiology:  US DUP LOWER EXTREMITY RIGHT KOKI   Final Result      No sonographic evidence of deep venous thrombosis within the  right lower extremity. CTA Chest W WO  (PE study)   Final Result      No CT evidence of pulmonary embolism. Ill-defined groundglass opacities with small bilateral pleural effusions. Findings could relate to pulmonary edema. Infection should be excluded clinically. Sequela from remote granulomatous disease. Areas of probable scarring/atelectasis in the lungs. XR CHEST PORTABLE   Final Result      Patchy bilateral opacities may relate to pulmonary edema or pneumonia in the appropriate clinical setting. Small bilateral pleural effusions. CT HIP RIGHT WO CONTRAST   Final Result      No CT evidence for acute right hip fracture. Osteoarthritis. CT LUMBAR SPINE WO CONTRAST   Final Result      No acute fracture or traumatic subluxation. Multilevel degenerative changes. XR PELVIS (1-2 VIEWS)   Final Result      No acute findings. XR FEMUR RIGHT (MIN 2 VIEWS)   Final Result      No acute findings. XR CHEST (2 VW)    (Results Pending)   MRI HIP RIGHT WO CONTRAST    (Results Pending)           Assessment/Plan:    79 y/o female with history of HTN who presented with right LE pain s/p fall at home.     Acute hypoxic respiratory failure  - CTA of the chest was negative for PE, showed ground glass opacities and bilateral pleural effusions  - on IV Rocephin, Zithromax, duonebs, O2 therapy  - TTE pending  - pulmonology following    Right thigh pain s/p fall  - imaging was negative for acute findings per radiology report  - MRI ordered to r/o occult fracture per Ortho   - pain control  - PT/OT    NAG metabolic acidosis  - trending down, mild lactic acidosis resolved with

## 2020-09-20 NOTE — PROGRESS NOTES
Hospitalist Progress Note      PCP: Charity Reyes DO    Date of Admission: 9/18/2020    Chief Complaint:  No acute events, afebrile, stable HD,     Medications:  Reviewed    Infusion Medications   Scheduled Medications    [START ON 9/21/2020] lisinopril  20 mg Oral Daily    sodium chloride flush  10 mL Intravenous 2 times per day    enoxaparin  30 mg Subcutaneous Daily    famotidine  10 mg Oral Daily    azithromycin  500 mg Intravenous Q24H    And    cefTRIAXone (ROCEPHIN) IV  1 g Intravenous Q24H    aspirin  81 mg Oral Daily    atenolol  100 mg Oral Daily     PRN Meds: labetalol, sodium chloride flush, acetaminophen **OR** acetaminophen, polyethylene glycol, promethazine **OR** ondansetron, albuterol, ketorolac      Intake/Output Summary (Last 24 hours) at 9/20/2020 1655  Last data filed at 9/20/2020 0256  Gross per 24 hour   Intake 10 ml   Output --   Net 10 ml       Exam:    BP (!) 193/69   Pulse 76   Temp 97.5 °F (36.4 °C) (Oral)   Resp 20   Ht 5' 2\" (1.575 m)   Wt 115 lb (52.2 kg)   SpO2 91%   BMI 21.03 kg/m²     General appearance: appears stated age and cooperative. Respiratory:  Coarse BS with faint wheezing bilaterally . Cardiovascular: Regular rate and rhythm with normal S1/S2. Abdomen: Soft, active bowel sounds. Musculoskeletal: No edema bilaterally.      Labs:   Recent Labs     09/18/20  1830 09/18/20  2254 09/19/20  1329 09/20/20  0658   WBC 15.8*  --  11.7* 10.9*   HGB 15.4 15.4 14.8 14.5   HCT 46.5  --  44.3 43.4     --  156 133     Recent Labs     09/18/20  1830 09/18/20  2254 09/19/20  0645 09/20/20  0658   *  --  136 134*   K 4.1  --  4.1 4.8     --  106 105   CO2 19*  --  16* 14*   BUN 29*  --  28* 22   CREATININE 0.97* 1.0 0.92* 0.72   CALCIUM 9.2  --  9.6 9.0   PHOS  --   --   --  3.1     Recent Labs     09/18/20  1830 09/19/20  0645   AST 23 20   ALT 15 13   BILITOT 0.7 0.6   ALKPHOS 77 78     Recent Labs     09/18/20 1830   INR 1.0     No results for input(s): Laine Ramey in the last 72 hours. Urinalysis:    No results found for: [place], BACTERIA, RBCUA, BLOODU, [address], [address]    Radiology:  MRI HIP RIGHT WO CONTRAST   Final Result      US DUP LOWER EXTREMITY RIGHT KOKI   Final Result      No sonographic evidence of deep venous thrombosis within the  right lower extremity. CTA Chest W WO  (PE study)   Final Result      No CT evidence of pulmonary embolism. Ill-defined groundglass opacities with small bilateral pleural effusions. Findings could relate to pulmonary edema. Infection should be excluded clinically. Sequela from remote granulomatous disease. Areas of probable scarring/atelectasis in the lungs. XR CHEST PORTABLE   Final Result      Patchy bilateral opacities may relate to pulmonary edema or pneumonia in the appropriate clinical setting. Small bilateral pleural effusions. CT HIP RIGHT WO CONTRAST   Final Result      No CT evidence for acute right hip fracture. Osteoarthritis. CT LUMBAR SPINE WO CONTRAST   Final Result      No acute fracture or traumatic subluxation. Multilevel degenerative changes. XR PELVIS (1-2 VIEWS)   Final Result      No acute findings. XR FEMUR RIGHT (MIN 2 VIEWS)   Final Result      No acute findings. XR CHEST (2 VW)    (Results Pending)           Assessment/Plan:    79 y/o female with history of HTN who presented with right LE pain s/p fall at home.     Acute hypoxic respiratory failure  - CTA of the chest was negative for PE, showed ground glass opacities and bilateral pleural effusions  - on IV Rocephin, Zithromax, duonebs, O2 therapy  - TTE pending  - pulmonology following    Right thigh pain s/p fall  - initial imaging was negative for acute findings per radiology report  - MRI showed a nondisplaced intertrochanteric fracture of the right femur  - Ortho following  - pain control  - PT/OT    NAG metabolic acidosis  - trending down, mild lactic acidosis resolved with IVFs  - monitor    HTN  - continue home meds            Electronically signed by Farhan Sky MD on 9/20/2020 at 4:55 PM

## 2020-09-21 PROBLEM — S72.141A CLOSED INTERTROCHANTERIC FRACTURE OF HIP, RIGHT, INITIAL ENCOUNTER (HCC): Status: ACTIVE | Noted: 2020-01-01

## 2020-09-21 NOTE — OP NOTE
Orthopedics Operative Note    Lior Camarillo  [id number]  9/18/2020 - 9/21/2020    Pre-op Diagnosis: Right nondisplaced intertrochanteric femur fracture  Post-op Diagnosis: Same    Procedure(s):  TFN RIGHT INTERTROCHANTERIC HIP FRACTURE, cephalic nailing right intertrochanteric femur fracture    Surgeon(s):  Tony Salguero MD    Anesthesia: General    Staff: Circulator: Dudley Mckeon RN  Surgical Assistant: Vianney Cerda  First Assistant: Capo Carrillo  Physician Assistant: Rachelle Dominguez PA-C    Estimated Blood Loss: 10 mL    Specimens: none    Drains:      Complications: None    Procedure:   Preoperatively, in the preoperative holding area, a huddle was  held with the operating room nurse, the surgeon and anesthesia, confirming  the patient, their age and allergies, the site, the procedure and if the site was marked, when the patient ate and drank last, if the antibiotics were hanging and if anyone of the staff or patient had any other concerns. The huddle was affirmed. The patient was brought to the operating room, received IV  antibiotics. After the induction of general anesthesia, the patient was placed in supine position on the fracture table. The [left] leg was placed in a thigh limon, and the [right] leg was placed in extension. Under fluoroscopic imaging the [right] hip was aligned. The [right] knee, thigh and buttocks were prepped and draped in the usual sterile fashion. The patient received antibiotics, [and TXA]    Time-out was performed,   1.) Confirming the patient. 2.) Confirming allergies. 3.) Confirming the procedure. 4.) Confirming the site. 5.) Confirming the patient was asleep and that it was safe to proceed from   anesthesia standpoint. 6.) That the patient had received the antibiotics prescribed   7.) Confirming with the scrub nurse that all appropriate tools were present in the room. 8.) If there imaging studies were up on the Computer screen.   9.) that everyone was wearing lead  10.) Noted that the tourniquet was set at 250 mmHg  11.) If the Chloro Prep had dried for 3 minutes prior to draping. 12.) The Fire Risk is a 3. The is saline on the table and a protector for the bovie and arthroscope light  13.) if Everyone in the room is wearing OSHA qualifying eye wear.  14.) That the patient could lose more than 500 mL blood, and that therefore type & cross was indicated  15.) That SCD stockings were on, and that the pump was working. 16.) That the warming blanket was placed on the patient and was working  17.) To communicate exchanging sharp objects, the [employer] closed loop communication was used: Call-back  18.) That no one in the room had any concerns,  19.) Affirmation of the sign out was obtained. And the procedure was commenced    Under fluoroscopic imaging the appropriate site of the incision was determined. A 3 cm incision was made just proximal to the greater tuberosity with a 10 blade. With cauterizer and sharp dissection the plane was taken down through the fascia valery. A guidewire was placed under fluoroscopic imaging through the tip of the greater trochanter into the proximal femur. This was overdrilled, and a size [10] mm x 130 degrees ATFN was advanced into the proximal femur. It was advanced to the appropriate depth. Under fluoroscopic imaging and through a second incision, using the guide, a guide pin was placed into the head of the femur, favoring the inferior posterior aspect of the head. A [79] mm helical blade was measured and inserted without any complications. Through the third incision the distal locking screw length that determined, and a [34] mm locking screw was inserted. All wounds were extensively irrigated, the deep fascia was approximated 0 Vicryl suture, at which the skin flaps were approximated 2-0 Vicryl suture, and closed with a subcuticular Monocryl suture.   Steri-Strips were placed followed by sterile dressing. The patient tolerated the procedure well and was returned to the recovery room in stable condition.        Electronically signed by Yelena Marquez MD on 9/21/2020 at 10:34 AM

## 2020-09-21 NOTE — PLAN OF CARE
Problem: Pain:  Goal: Pain level will decrease  Description: Pain level will decrease  9/21/2020 0039 by Gilbert Lo RN  Outcome: Ongoing  9/21/2020 0036 by Gilbert Lo RN  Outcome: Ongoing  Goal: Control of acute pain  Description: Control of acute pain  9/21/2020 0039 by Gilbert Lo RN  Outcome: Ongoing  9/21/2020 0036 by Gilbert Lo RN  Outcome: Ongoing  Goal: Control of chronic pain  Description: Control of chronic pain  9/21/2020 0039 by Gilbert Lo RN  Outcome: Ongoing  9/21/2020 0036 by Gilbert Lo RN  Outcome: Ongoing     Problem: Falls - Risk of:  Goal: Will remain free from falls  Description: Will remain free from falls  9/21/2020 0039 by Gilbert Lo RN  Outcome: Ongoing  9/21/2020 0036 by Gilbert Lo RN  Outcome: Ongoing  Goal: Absence of physical injury  Description: Absence of physical injury  9/21/2020 0039 by Gilbert Lo RN  Outcome: Ongoing  9/21/2020 0036 by Gilbert Lo RN  Outcome: Ongoing     Problem: Skin Integrity:  Goal: Will show no infection signs and symptoms  Description: Will show no infection signs and symptoms  9/21/2020 0039 by Gilbert Lo RN  Outcome: Ongoing  9/21/2020 0036 by Gilbert Lo RN  Outcome: Ongoing  Goal: Absence of new skin breakdown  Description: Absence of new skin breakdown  9/21/2020 0039 by Gilbert Lo RN  Outcome: Ongoing  9/21/2020 0036 by Gilbert Lo RN  Outcome: Ongoing     Problem: Breathing Pattern - Ineffective:  Goal: Ability to achieve and maintain a regular respiratory rate will improve  Description: Ability to achieve and maintain a regular respiratory rate will improve  9/21/2020 0039 by Gilbert Lo RN  Outcome: Ongoing  9/21/2020 0036 by Gilbert Lo RN  Outcome: Ongoing     Problem: Coping:  Goal: Ability to remain calm will improve  Description: Ability to remain calm will improve  Outcome: Ongoing     Problem: Safety:  Goal: Ability to remain free from injury will improve  Description: Ability to remain free from injury will improve  Outcome: Ongoing     Problem: Self-Care:  Goal: Ability to participate in self-care as condition permits will improve  Description: Ability to participate in self-care as condition permits will improve  Outcome: Ongoing

## 2020-09-21 NOTE — PROGRESS NOTES
Patient is alert and oriented x 2-3. Confused at times, but easily reorients. Pt currently off unit for hip surgery with Dr. Janelle Lin. Report given to short stay. Patients daughter present and updated on plan of care.      Electronically signed by Mandi Angulo RN on 9/21/2020 at 9:11 AM

## 2020-09-21 NOTE — PROGRESS NOTES
Spiritual Care Services     Summary of Visit:   responded to a code on 4W. Patient  and  provided prayer and grief support to patient's daughter. Spiritual Assessment/Intervention/Outcomes:    Encounter Summary  Services provided to[de-identified] Patient and family together  Support System: Children, Family members, [employer]ec[employer]al/nancy community, Friends/neighbors  Continue Visiting: No  Complexity of Encounter: High  Length of Encounter: 2 hours              Grief and Life Adjustment  Type: Death  Assessment: Tearful, Grieving  Intervention: Prayer, Sustaining presence/ Ministry of presence, Grief care, End of life care  Outcome: Comfort, Coping, Grieving  Advance Directives (For Healthcare)  Pre-existing DNR Comfort Care/DNR Arrest/DNI Order: No  Healthcare Directive: No, patient does not have an advance directive for healthcare treatment  Information on Healthcare Directives Requested: No  Patient Requests Assistance: No  Advance Directives: Pt. not interested at this time           Values / Beliefs  Do you have any ethnic, cultural, sacramental, or spiritual Hindu needs you would like us to be aware of while you are in the hospital?: No    Care Plan:        [id number] [address]   Electronically signed by Solo Prather on 20 at 7:57 PM EDT     To reach a  for emotional and spiritual support, place an [facility] consult request.   If a  is needed immediately, dial 0 and ask to page the on-call .

## 2020-09-21 NOTE — ANESTHESIA POSTPROCEDURE EVALUATION
Department of Anesthesiology  Postprocedure Note    Patient: Osvaldo Cabrera  MRN: [id number]  YOB: 1922  Date of evaluation: 9/21/2020  Time:  10:50 AM     Procedure Summary     Date:  09/21/20 Room / Location:  [address]    Anesthesia Start:  6900 Anesthesia Stop:      Procedure:  TFN RIGHT INTERTROCHANTERIC HIP FRACTURE (Right ) Diagnosis:  (INPATIENT)    Surgeon:  Nadyne Opitz, MD Responsible Provider:  Monty Sampson DO    Anesthesia Type:  spinal, regional ASA Status:  3 - Emergent          Anesthesia Type: spinal, regional    Violet Phase I:   10    Violet Phase II:      Last vitals: Reviewed and per EMR flowsheets.        Anesthesia Post Evaluation    Patient location during evaluation: bedside  Patient participation: complete - patient participated  Level of consciousness: awake and awake and alert  Pain score: 0  Airway patency: patent  Nausea & Vomiting: no nausea and no vomiting  Complications: no  Cardiovascular status: blood pressure returned to baseline and hemodynamically stable  Respiratory status: acceptable  Hydration status: euvolemic

## 2020-09-21 NOTE — ANESTHESIA PROCEDURE NOTES
Ultrasound guided right lateral femoral cutaneouys + PENG    Patient location during procedure: pre-op  Start time: 9/21/2020 9:14 AM  End time: 9/21/2020 9:24 AM  Staffing  Anesthesiologist: Leon Simons DO  Performed: anesthesiologist   Preanesthetic Checklist  Completed: patient identified, site marked, surgical consent, pre-op evaluation, timeout performed, IV checked, risks and benefits discussed, monitors and equipment checked, anesthesia consent given, oxygen available and patient being monitored  Peripheral Block  Patient position: supine  Prep: ChloraPrep  Patient monitoring: cardiac monitor, continuous pulse ox, frequent blood pressure checks and IV access  Block type: Femoral lateral cutaneous and PENG  Laterality: right  Injection technique: single-shot  Procedures: ultrasound guided  Local infiltration: ropivacaine  Infiltration strength: 0.5 %  Dose: 20 mL  Provider prep: mask and sterile gloves (Sterile probe cover)  Local infiltration: ropivacaine  Needle  Needle type: combined needle/nerve stimulator   Needle gauge: 22 G  Needle length: 10 cm  Needle localization: anatomical landmarks and ultrasound guidance  Assessment  Injection assessment: negative aspiration for heme, no paresthesia on injection and local visualized surrounding nerve on ultrasound  Paresthesia pain: immediately resolved  Slow fractionated injection: yes  Hemodynamics: stable  Additional Notes  Ultrasound image printed and saved in patient chart.     Sterile probe cover used    LFC 5 ml     PENG 15 ml  Medications Administered  Ropivacaine (NAROPIN) injection 0.5%, 20 mL  Reason for block: post-op pain management and at surgeon's request

## 2020-09-21 NOTE — PROGRESS NOTES
Dr Addison Shook given update on patient's complaint of \"indigestion\" and \"chest pain\" which \"seems better at times,\"MP RSR SAO2 97% on 3L nc Breaths sounds clear to anterior auscultation. Right hip dressing clean and dry. , right pedal pulse pulse audible with doppler Sensastion level at L1,

## 2020-09-21 NOTE — SIGNIFICANT EVENT
Rapid response called at [phone] and subsequently code blue was called around 1805 as patient became unresponsive and hypoxic. No pulse was palpated and compressions were started. Arrived to the room with compressions underway. Daughter had just left and was called back to discuss goals of care. Two rounds of epi were given. Respiratory and anesthesia at bedside. Pt continued to be asystole and PEA on the monitor. Daughter arrived and stated that pt would not want these extreme measures and would not want compressions, intubation or defibrillation. She called her daughter to discuss and were in agreement to stop and make the patient comfortable. Compressions were stopped at 1817. PEA on the monitor, pt with a couple agonal breaths and then was apneic. No pulses noted at carotid or femoral. No heart or breath sounds noted. Code called at [phone] and pt pronounced. Daughter present to make further arrangements arrangements.      Faheem Linda, DO  Internal Medicine

## 2020-09-21 NOTE — ANESTHESIA PROCEDURE NOTES
Spinal Block    Patient location during procedure: pre-op  Start time: 9/21/2020 9:14 AM  End time: 9/21/2020 9:24 AM  Reason for block: primary anesthetic  Staffing  Anesthesiologist: Efraín Espinoza DO  Performed: anesthesiologist   Preanesthetic Checklist  Completed: patient identified, site marked, surgical consent, pre-op evaluation, timeout performed, IV checked, risks and benefits discussed, monitors and equipment checked, anesthesia consent given, oxygen available and patient being monitored  Spinal Block  Patient position: right lateral decubitus  Prep: Betadine  Patient monitoring: cardiac monitor, continuous pulse ox and frequent blood pressure checks  Approach: midline  Location: L4/L5  Provider prep: mask and sterile gloves  Local infiltration: lidocaine  Agent: bupivacaine (Isobaric bupivicain 0.5%)  Dose: 2  Dose: 2  Needle  Needle type: Pencan   Needle gauge: 22 G  Needle length: 3.5 in  Assessment  Sensory level: T6  Events: cerebrospinal fluid  Lysis level: CSF aspirated. CSF: clear  Attempts: 1  Hemodynamics: stable  Additional Notes  Free flowing CSF. Negative heme. Negative paresthesia. [place]

## 2020-09-21 NOTE — PROGRESS NOTES
Physical Therapy Missed Treatment   Facility/Department: [facility] MED SURG [id number]    NAME: aL Pulido    : 1922 (80 y.o.)  MRN: [id number]    Account: [de-identified]  Gender: female      PT evaluation and treatment orders received. Chart reviewed. Pt off floor for R hip TFN. Hold PT today and await post-op orders for ortho. Nursing staff notified. Will follow and attempt PT evaluation again at earliest availability.        Elieser Carballo, PT, 20 at 9:07 AM

## 2020-09-21 NOTE — ANESTHESIA PRE PROCEDURE
Department of Anesthesiology  Preprocedure Note       Name:  Celeste Nurse   Age:  80 y.o.  :  1922                                          MRN:  [id number]         Date:  2020      Surgeon: Josefina Roque):  Andrew Badillo MD    Procedure: Procedure(s):  TFN RIGHT INTERTROCHANTERIC HIP FRACTURE    Medications prior to admission:   Prior to Admission medications    Medication Sig Start Date End Date Taking?  Authorizing Provider   ASPIRIN 81 PO Take 81 mg by mouth daily   Yes Historical Provider, MD   albuterol sulfate  (90 Base) MCG/ACT inhaler Inhale 90 g into the lungs nightly 20  Yes Historical Provider, MD   atenolol (TENORMIN) 50 MG tablet Take 100 mg by mouth daily 19  Yes Historical Provider, MD   lisinopril (PRINIVIL;ZESTRIL) 10 MG tablet Take 10 mg by mouth daily   Yes Historical Provider, MD       Current medications:    Current Facility-Administered Medications   Medication Dose Route Frequency Provider Last Rate Last Dose    0.9 % sodium chloride infusion   Intravenous Continuous Andrew Badillo MD        sodium chloride 0.9 % infusion             labetalol (NORMODYNE;TRANDATE) injection 10 mg  10 mg Intravenous Q4H PRN Andrea Miller MD   10 mg at 20 1110    lisinopril (PRINIVIL;ZESTRIL) tablet 20 mg  20 mg Oral Daily Andrea Miller MD        sodium chloride flush 0.9 % injection 10 mL  10 mL Intravenous 2 times per day ALTA Manriquez - CNP   10 mL at 20 0154    sodium chloride flush 0.9 % injection 10 mL  10 mL Intravenous PRN ALTA Manriquez CNP   10 mL at 20 0330    acetaminophen (TYLENOL) tablet 650 mg  650 mg Oral Q6H PRN Britt Duffy APRN - CNP   650 mg at 20 1232    Or    acetaminophen (TYLENOL) suppository 650 mg  650 mg Rectal Q6H PRN Britt Duffy APRN - CNP        polyethylene glycol (GLYCOLAX) packet 17 g  17 g Oral Daily PRN Britt Duffy APRN - CNP       [facility] 09/19/20 2111 09/20/20 0943 09/20/20 1940   BP: (!) 189/68 136/69 (!) 193/69 136/68   Pulse: 82  76 73   Resp: 20 16 20 16   Temp: 98.3 °F (36.8 °C)  97.5 °F (36.4 °C) 97.2 °F (36.2 °C)   TempSrc: Oral  Oral Oral   SpO2:    93%   Weight:       Height:                                                  BP Readings from Last 3 Encounters:   09/20/20 136/68   08/31/20 (!) 161/91       NPO Status: Time of last liquid consumption: 1800                        Time of last solid consumption: 1800                        Date of last liquid consumption: 09/20/20                        Date of last solid food consumption: 09/20/20    BMI:   Wt Readings from Last 3 Encounters:   09/18/20 115 lb (52.2 kg)   08/31/20 115 lb (52.2 kg)     Body mass index is 21.03 kg/m².     CBC:   Lab Results   Component Value Date    WBC 10.0 09/21/2020    RBC 4.59 09/21/2020    HGB 14.0 09/21/2020    HCT 41.9 09/21/2020    MCV 91.3 09/21/2020    RDW 13.2 09/21/2020     09/21/2020       CMP:   Lab Results   Component Value Date     09/21/2020    K 4.0 09/21/2020    K 4.1 09/19/2020     09/21/2020    CO2 19 09/21/2020    BUN 20 09/21/2020    CREATININE 0.69 09/21/2020    GFRAA >60.0 09/21/2020    LABGLOM >60.0 09/21/2020    GLUCOSE 90 09/21/2020    PROT 6.4 09/19/2020    CALCIUM 9.1 09/21/2020    BILITOT 0.6 09/19/2020    ALKPHOS 78 09/19/2020    AST 20 09/19/2020    ALT 13 09/19/2020       POC Tests:   Recent Labs     09/18/20  2254   POCGLU 126*   POCNA 138   POCK 4.0   POCCL 109   POCHCT 45       Coags:   Lab Results   Component Value Date    PROTIME 13.5 09/18/2020    INR 1.0 09/18/2020    APTT 33.3 09/18/2020       HCG (If Applicable): No results found for: PREGTESTUR, PREGSERUM, HCG, HCGQUANT     ABGs:   Lab Results   Component Value Date    PHART 7.410 09/18/2020    PO2ART 59 09/18/2020    DPR9USF 29 09/18/2020    OUV0OFF 18.6 09/18/2020    BEART -6 09/18/2020    F3WUKCSF 91 09/18/2020        Type & Screen (If Applicable):  No results found for: LABABO, LABRH    Drug/Infectious Status (If Applicable):  No results found for: HIV, HEPCAB    COVID-19 Screening (If Applicable): No results found for: COVID19      Anesthesia Evaluation  Patient summary reviewed and Nursing notes reviewed no history of anesthetic complications:   Airway: Mallampati: II  TM distance: >3 FB   Neck ROM: full  Mouth opening: > = 3 FB Dental: normal exam         Pulmonary:normal exam  breath sounds clear to auscultation  (+) pneumonia:                             Cardiovascular:  Exercise tolerance: good (>4 METS),   (+) hypertension:,       ECG reviewed  Rhythm: regular  Rate: normal           Beta Blocker:  Dose within 24 Hrs      ROS comment: Sinus rhythm with short ID  Right bundle branch block  Left anterior fascicular block  Bifascicular block   Abnormal ECG  No previous ECGs available     Neuro/Psych:   Negative Neuro/Psych ROS              GI/Hepatic/Renal:   (+) GERD:,           Endo/Other: Negative Endo/Other ROS             Pt had PAT visit. Abdominal:           Vascular: negative vascular ROS. Anesthesia Plan      spinal and regional     ASA 3 - emergent     (Lateral femoral cutaneous + PENG  Last Lovenox 9/21/20 9AM)      MIPS: Postoperative opioids intended and Prophylactic antiemetics administered. Anesthetic plan and risks discussed with patient. Plan discussed with CRNA.     Attending anesthesiologist reviewed and agrees with Tay Skinner DO   9/21/2020

## 2020-09-21 NOTE — PROGRESS NOTES
Right hip dressing clean and dry. Feeling sensation at L2, moving feet slightly, states \"my legs still feel kind of numb. \", denies right hip pain. Right pedal audible with doppler. MP RSR. SAO2 95% on 3L nC. States headach \"comes and goes. , states 'chest pain\" is Better. \"

## 2020-09-21 NOTE — PROGRESS NOTES
Pt in bed resting. Assessment documented. Uses bedpan, turns with assist r/t fracture w/i femur. Calm and A&O *2 at this time. No needs expressed at this time. Spoke with daughter Ankush Zhao, updated on care. Fall prevention in place. Will continue to monitor.  Electronically signed by Dilia Peraza RN on 9/20/2020 at 8:26 PM

## 2020-09-21 NOTE — PROGRESS NOTES
Pt returned from surgery. She is alert. Breathing labored. Pt 87% on 4L upon arrival. Pt increased to 5L- no improvement so patient was placed on 50% ventimask. Dr. Zulema Godoy notified. STAT portable ch xray ordered.      Electronically signed by Juan Pablo Vincent RN on 9/21/2020 at 2:35 PM

## 2020-09-22 NOTE — PROGRESS NOTES
Pt placed in body bag. Identification tag attached to toe and on outside of bag and pt belongings. Pt has a set upper and lower dentures, 1 watch and 1 wedding ring on her left hand. We were unable to remove the ring. Pt is to be a  case. They will be here to see pt in the morning.

## 2020-10-15 NOTE — DISCHARGE SUMMARY
Physician Discharge Summary     Patient ID:  Truman Ogden  [id number]  3 y.o.  1922    Admit date: 2020    Discharge date : 2020     Admitting Physician: Jon Stevenson MD     Discharge Physician: Eloy Zacarias MD     Admission Diagnoses: Leg pain [M79.606]  Acute respiratory failure ([facility] [address] [J96.00]    Discharge Diagnoses: , acute hypoxic resp failure, right femur fx    Admission Condition: fair    Discharged Condition:     Hospital Course:   79 y/o female with history of HTN who presented with right LE pain s/p fall at home.     Acute hypoxic respiratory failure  - CTA of the chest was negative for PE, showed ground glass opacities and bilateral pleural effusions  - on IV Rocephin, Zithromax, duonebs, O2 therapy  - TTE pending  - pulmonology following     Right thigh pain s/p fall  - initial imaging was negative for acute findings per radiology report  - MRI showed a nondisplaced intertrochanteric fracture of the right femur  - Ortho following  - pain control  - PT/OT   - to OR today for repair   - see significant event note, patient was found asystole, PEA, then daughter arrived and made patient DNR due to extenuating circumstances. Patient was pronounced  at 200.     NAG metabolic acidosis  - trending down, mild lactic acidosis resolved with IVFs  - monitor            DC time 35 minutes    Signed:  Electronically signed by Eloy Zacarias MD on 10/15/2020 at 1:22 AM

## (undated) DEVICE — STAPLER SKIN H3.9MM WIRE DIA0.58MM CRWN 6.9MM 35 STPL FIX

## (undated) DEVICE — ELECTRODE PT RET AD L9FT HI MOIST COND ADH HYDRGEL CORDED

## (undated) DEVICE — SPONGE GZ W4XL4IN COT 12 PLY TYP VII WVN C FLD DSGN

## (undated) DEVICE — DRAPE SURG W41XL74IN CLR FULL SZ C ARM 3 ADH POLY STRP E

## (undated) DEVICE — INTENDED FOR TISSUE SEPARATION, AND OTHER PROCEDURES THAT REQUIRE A SHARP SURGICAL BLADE TO PUNCTURE OR CUT.: Brand: BARD-PARKER ® CARBON RIB-BACK BLADES

## (undated) DEVICE — BIT DRL L330MM DIA4.2MM CALIB 100MM 3 FLUT QUIK CPL

## (undated) DEVICE — COUNTER NDL 40 COUNT HLD 70 FOAM BLK ADH W/ MAG

## (undated) DEVICE — COVER LT HNDL BLU PLAS

## (undated) DEVICE — GLOVE SURG SZ 9 THK91MIL LTX FREE SYN POLYISOPRENE ANTI

## (undated) DEVICE — HYPODERMIC SAFETY NEEDLE: Brand: MAGELLAN

## (undated) DEVICE — PENCIL SMOKE EVAC PUSH BUTTON COATED

## (undated) DEVICE — GOWN,AURORA,NONREINFORCED,LARGE: Brand: MEDLINE

## (undated) DEVICE — Device: Brand: BOOT LINER, DISPOSABLE

## (undated) DEVICE — SPONGE,LAP,18"X18",DLX,XR,ST,5/PK,40/PK: Brand: MEDLINE

## (undated) DEVICE — Device: Brand: PROTECTORS, LEG SPAR BALL JOINT, 12/PR

## (undated) DEVICE — GUIDEWIRE ORTH L400MM DIA3.2MM FOR TFN

## (undated) DEVICE — SUTURE VCRL SZ 0 L36IN ABSRB UD L36MM CT-1 1/2 CIR J946H

## (undated) DEVICE — APPLICATOR MEDICATED 26 CC SOLUTION HI LT ORNG CHLORAPREP

## (undated) DEVICE — MARKER SURG SKIN GENTIAN VLT REG TIP W/ 6IN RUL

## (undated) DEVICE — SYRINGE IRRIG 60ML SFT PLIABLE BLB EZ TO GRP 1 HND USE W/

## (undated) DEVICE — DRESSING GZ W1XL8IN COT XRFRM N ADH OVERWRAP CURAD

## (undated) DEVICE — PACK,SET UP,DRAPE: Brand: MEDLINE

## (undated) DEVICE — SUTURE VCRL SZ 2-0 L36IN ABSRB UD L36MM CT-1 1/2 CIR J945H

## (undated) DEVICE — 3M™ STERI-STRIP™ REINFORCED ADHESIVE SKIN CLOSURES, R1547, 1/2 IN X 4 IN (12 MM X 100 MM), 6 STRIPS/ENVELOPE: Brand: 3M™ STERI-STRIP™

## (undated) DEVICE — GOWN,SIRUS,POLYRNF,BRTHSLV,XLN/XL,20/CS: Brand: MEDLINE

## (undated) DEVICE — SUTURE MCRYL SZ 4-0 L27IN ABSRB UD L19MM PS-2 1/2 CIR PRIM Y426H

## (undated) DEVICE — Device: Brand: COVER, PERINEAL POST, 12 PK

## (undated) DEVICE — SYRINGE MED 10ML LUERLOCK TIP W/O SFTY DISP

## (undated) DEVICE — DRAPE,UTILITY,TAPE,15X26,STERILE: Brand: MEDLINE

## (undated) DEVICE — YANKAUER,SMOOTH HANDLE,HIGH CAPACITY: Brand: MEDLINE INDUSTRIES, INC.

## (undated) DEVICE — LABEL MED MINI W/ MARKER

## (undated) DEVICE — DRAPE,ISOLATION,INCISE,INVISISHIELD: Brand: MEDLINE

## (undated) DEVICE — TUBING, SUCTION, 1/4" X 10', STRAIGHT: Brand: MEDLINE

## (undated) DEVICE — DRESSING ANTIMIC FOAM MEPILEX BORD POSTOP AG PROD SZ 4X12 IN

## (undated) DEVICE — PAD,ABDOMINAL,8"X10",ST,LF: Brand: MEDLINE

## (undated) DEVICE — 3M™ STERI-DRAPE™ U-DRAPE 1015: Brand: STERI-DRAPE™